# Patient Record
Sex: FEMALE | Race: WHITE | NOT HISPANIC OR LATINO | URBAN - METROPOLITAN AREA
[De-identification: names, ages, dates, MRNs, and addresses within clinical notes are randomized per-mention and may not be internally consistent; named-entity substitution may affect disease eponyms.]

---

## 2017-12-05 ENCOUNTER — APPOINTMENT (OUTPATIENT)
Dept: URBAN - METROPOLITAN AREA CLINIC 200 | Age: 47
Setting detail: DERMATOLOGY
End: 2017-12-11

## 2017-12-05 ENCOUNTER — APPOINTMENT (OUTPATIENT)
Dept: URBAN - METROPOLITAN AREA CLINIC 200 | Age: 47
Setting detail: DERMATOLOGY
End: 2017-12-12

## 2017-12-05 DIAGNOSIS — Z41.9 ENCOUNTER FOR PROCEDURE FOR PURPOSES OTHER THAN REMEDYING HEALTH STATE, UNSPECIFIED: ICD-10-CM

## 2017-12-05 DIAGNOSIS — L91.8 OTHER HYPERTROPHIC DISORDERS OF THE SKIN: ICD-10-CM

## 2017-12-05 DIAGNOSIS — L57.8 OTHER SKIN CHANGES DUE TO CHRONIC EXPOSURE TO NONIONIZING RADIATION: ICD-10-CM

## 2017-12-05 DIAGNOSIS — L81.4 OTHER MELANIN HYPERPIGMENTATION: ICD-10-CM

## 2017-12-05 DIAGNOSIS — L82.0 INFLAMED SEBORRHEIC KERATOSIS: ICD-10-CM

## 2017-12-05 DIAGNOSIS — D22 MELANOCYTIC NEVI: ICD-10-CM

## 2017-12-05 PROBLEM — D22.5 MELANOCYTIC NEVI OF TRUNK: Status: ACTIVE | Noted: 2017-12-05

## 2017-12-05 PROCEDURE — OTHER BENIGN DESTRUCTION COSMETIC: OTHER

## 2017-12-05 PROCEDURE — OTHER OBSERVATION AND MEASURE: OTHER

## 2017-12-05 PROCEDURE — OTHER COUNSELING: OTHER

## 2017-12-05 PROCEDURE — OTHER OBSERVATION: OTHER

## 2017-12-05 PROCEDURE — 99203 OFFICE O/P NEW LOW 30 MIN: CPT

## 2017-12-05 ASSESSMENT — LOCATION SIMPLE DESCRIPTION DERM
LOCATION SIMPLE: RIGHT INFERIOR MUCOSAL LIP
LOCATION SIMPLE: RIGHT UPPER BACK
LOCATION SIMPLE: RIGHT INFERIOR MUCOSAL LIP
LOCATION SIMPLE: POSTERIOR NECK
LOCATION SIMPLE: CHEST

## 2017-12-05 ASSESSMENT — LOCATION ZONE DERM
LOCATION ZONE: LIP
LOCATION ZONE: NECK
LOCATION ZONE: TRUNK
LOCATION ZONE: LIP
LOCATION ZONE: TRUNK

## 2017-12-05 ASSESSMENT — LOCATION DETAILED DESCRIPTION DERM
LOCATION DETAILED: RIGHT MEDIAL UPPER BACK
LOCATION DETAILED: UPPER STERNUM
LOCATION DETAILED: RIGHT INFERIOR MUCOSAL LIP
LOCATION DETAILED: RIGHT POSTERIOR NECK
LOCATION DETAILED: RIGHT INFERIOR MUCOSAL LIP

## 2017-12-05 NOTE — PROCEDURE: BENIGN DESTRUCTION COSMETIC
Post-Care Instructions: I reviewed with the patient in detail post-care instructions. Patient is to wear sunprotection, and avoid picking at any of the treated lesions. Pt may apply Vaseline to crusted or scabbing areas.
Anesthesia Volume In Cc: 0
Detail Level: Simple
Consent: The patient's consent was obtained including but not limited to risks of crusting, scabbing, blistering, scarring, darker or lighter pigmentary change, recurrence, incomplete removal and infection.

## 2019-08-19 ENCOUNTER — HOSPITAL ENCOUNTER (EMERGENCY)
Facility: HOSPITAL | Age: 49
Discharge: HOME | End: 2019-08-20
Attending: EMERGENCY MEDICINE
Payer: COMMERCIAL

## 2019-08-19 ENCOUNTER — APPOINTMENT (EMERGENCY)
Dept: RADIOLOGY | Facility: HOSPITAL | Age: 49
End: 2019-08-19
Attending: EMERGENCY MEDICINE
Payer: COMMERCIAL

## 2019-08-19 ENCOUNTER — HOSPITAL ENCOUNTER (OUTPATIENT)
Facility: CLINIC | Age: 49
Discharge: HOME | End: 2019-08-19
Attending: FAMILY MEDICINE
Payer: COMMERCIAL

## 2019-08-19 VITALS
HEART RATE: 72 BPM | RESPIRATION RATE: 19 BRPM | DIASTOLIC BLOOD PRESSURE: 92 MMHG | TEMPERATURE: 99.6 F | SYSTOLIC BLOOD PRESSURE: 160 MMHG

## 2019-08-19 DIAGNOSIS — R10.32 LEFT LOWER QUADRANT PAIN: Primary | ICD-10-CM

## 2019-08-19 DIAGNOSIS — N83.202 CYST OF LEFT OVARY: Primary | ICD-10-CM

## 2019-08-19 LAB
ALBUMIN SERPL-MCNC: 4 G/DL (ref 3.4–5)
ALP SERPL-CCNC: 63 IU/L (ref 35–126)
ALT SERPL-CCNC: 37 IU/L (ref 11–54)
ANION GAP SERPL CALC-SCNC: 9 MEQ/L (ref 3–15)
AST SERPL-CCNC: 34 IU/L (ref 15–41)
B-HCG UR QL: NEGATIVE
BASOPHILS # BLD: 0.06 K/UL (ref 0.01–0.1)
BASOPHILS NFR BLD: 0.6 %
BILIRUB SERPL-MCNC: 1.4 MG/DL (ref 0.3–1.2)
BILIRUBIN, POC: NEGATIVE
BLOOD URINE, POC: NEGATIVE
BUN SERPL-MCNC: 6 MG/DL (ref 8–20)
CALCIUM SERPL-MCNC: 8.8 MG/DL (ref 8.9–10.3)
CHLORIDE SERPL-SCNC: 103 MEQ/L (ref 98–109)
CLARITY, POC: CLEAR
CO2 SERPL-SCNC: 25 MEQ/L (ref 22–32)
COLOR, POC: YELLOW
CREAT SERPL-MCNC: 0.6 MG/DL
DIFFERENTIAL METHOD BLD: NORMAL
EOSINOPHIL # BLD: 0.16 K/UL (ref 0.04–0.36)
EOSINOPHIL NFR BLD: 1.7 %
ERYTHROCYTE [DISTWIDTH] IN BLOOD BY AUTOMATED COUNT: 12.3 % (ref 11.7–14.4)
GFR SERPL CREATININE-BSD FRML MDRD: >60 ML/MIN/1.73M*2
GLUCOSE SERPL-MCNC: 101 MG/DL (ref 70–99)
GLUCOSE URINE, POC: NEGATIVE
HCT VFR BLDCO AUTO: 38.1 %
HGB BLD-MCNC: 13.7 G/DL
IMM GRANULOCYTES # BLD AUTO: 0.05 K/UL (ref 0–0.08)
IMM GRANULOCYTES NFR BLD AUTO: 0.5 %
KETONES, POC: NEGATIVE
LEUKOCYTE EST, POC: NEGATIVE
LYMPHOCYTES # BLD: 2.29 K/UL (ref 1.2–3.5)
LYMPHOCYTES NFR BLD: 24.8 %
MAGNESIUM SERPL-MCNC: 1.9 MG/DL (ref 1.8–2.5)
MCH RBC QN AUTO: 32.5 PG (ref 28–33.2)
MCHC RBC AUTO-ENTMCNC: 36 G/DL (ref 32.2–35.5)
MCV RBC AUTO: 90.3 FL (ref 83–98)
MONOCYTES # BLD: 0.8 K/UL (ref 0.28–0.8)
MONOCYTES NFR BLD: 8.6 %
NEUTROPHILS # BLD: 5.89 K/UL (ref 1.7–7)
NEUTS SEG NFR BLD: 63.8 %
NITRITE, POC: NEGATIVE
NRBC BLD-RTO: 0 %
PDW BLD AUTO: 8.6 FL (ref 9.4–12.3)
PH, POC: 5
PLATELET # BLD AUTO: 247 K/UL
POCT TEST: NORMAL
POTASSIUM SERPL-SCNC: 3.4 MEQ/L (ref 3.6–5.1)
PROT SERPL-MCNC: 7.3 G/DL (ref 6–8.2)
PROTEIN, POC: NEGATIVE
RBC # BLD AUTO: 4.22 M/UL (ref 3.93–5.22)
SODIUM SERPL-SCNC: 137 MEQ/L (ref 136–144)
SPECIFIC GRAVITY, POC: 1.01
WBC # BLD AUTO: 9.25 K/UL

## 2019-08-19 PROCEDURE — 99284 EMERGENCY DEPT VISIT MOD MDM: CPT | Mod: 25

## 2019-08-19 PROCEDURE — 85025 COMPLETE CBC W/AUTO DIFF WBC: CPT | Performed by: EMERGENCY MEDICINE

## 2019-08-19 PROCEDURE — 74177 CT ABD & PELVIS W/CONTRAST: CPT

## 2019-08-19 PROCEDURE — 36415 COLL VENOUS BLD VENIPUNCTURE: CPT | Performed by: EMERGENCY MEDICINE

## 2019-08-19 PROCEDURE — 3E0337Z INTRODUCTION OF ELECTROLYTIC AND WATER BALANCE SUBSTANCE INTO PERIPHERAL VEIN, PERCUTANEOUS APPROACH: ICD-10-PCS | Performed by: EMERGENCY MEDICINE

## 2019-08-19 PROCEDURE — 63600105 HC IODINE BASED CONTRAST: Mod: JW | Performed by: EMERGENCY MEDICINE

## 2019-08-19 PROCEDURE — 96360 HYDRATION IV INFUSION INIT: CPT | Mod: 59

## 2019-08-19 PROCEDURE — 99202 OFFICE O/P NEW SF 15 MIN: CPT | Performed by: FAMILY MEDICINE

## 2019-08-19 PROCEDURE — 83735 ASSAY OF MAGNESIUM: CPT | Performed by: EMERGENCY MEDICINE

## 2019-08-19 PROCEDURE — S9088 SERVICES PROVIDED IN URGENT: HCPCS | Performed by: FAMILY MEDICINE

## 2019-08-19 PROCEDURE — 96361 HYDRATE IV INFUSION ADD-ON: CPT | Mod: 59

## 2019-08-19 PROCEDURE — 81002 URINALYSIS NONAUTO W/O SCOPE: CPT | Performed by: FAMILY MEDICINE

## 2019-08-19 PROCEDURE — 84450 TRANSFERASE (AST) (SGOT): CPT | Performed by: EMERGENCY MEDICINE

## 2019-08-19 PROCEDURE — 25800000 HC PHARMACY IV SOLUTIONS: Performed by: EMERGENCY MEDICINE

## 2019-08-19 RX ADMIN — SODIUM CHLORIDE 1000 ML: 9 INJECTION, SOLUTION INTRAVENOUS at 21:29

## 2019-08-19 RX ADMIN — IOHEXOL 110 ML: 350 INJECTION, SOLUTION INTRAVENOUS at 23:31

## 2019-08-19 ASSESSMENT — ENCOUNTER SYMPTOMS
HEADACHES: 0
JOINT SWELLING: 0
DIARRHEA: 0
FEVER: 1
DYSURIA: 0
DIFFICULTY URINATING: 0
PALPITATIONS: 0
ARTHRALGIAS: 0
SORE THROAT: 0
FEVER: 1
NAUSEA: 0
ABDOMINAL PAIN: 1
CHILLS: 0
VOMITING: 0
ABDOMINAL PAIN: 1
COUGH: 0
BLOOD IN STOOL: 0
NAUSEA: 0
DIARRHEA: 0
SHORTNESS OF BREATH: 0

## 2019-08-19 NOTE — ED PROVIDER NOTES
History  No chief complaint on file.    Five day history of LLQ abdominal pain.  She has had a low grade temp for several days.  Now the pain is across he entire abdomen.  She has pressure around her bladder but no dysuria  She has bilateral lower back pain.  She has no history of diverticulitis but it does run in her family  BM's normal with this episode.             No past medical history on file.    No past surgical history on file.    No family history on file.    Social History   Substance Use Topics   • Smoking status: Not on file   • Smokeless tobacco: Not on file   • Alcohol use Not on file       Review of Systems   Constitutional: Positive for fever.   Gastrointestinal: Positive for abdominal pain. Negative for blood in stool, diarrhea and nausea.       Physical Exam  ED Triage Vitals [08/19/19 1934]   Temp Heart Rate Resp BP SpO2   37.6 °C (99.6 °F) 72 19 (!) 160/92 --      Temp src Heart Rate Source Patient Position BP Location FiO2 (%) (Set)   -- -- -- Left upper arm --       Physical Exam   Constitutional: She appears well-nourished.   Abdominal: Soft. She exhibits no distension and no mass. There is tenderness (Suprapubic> LLQ> RLQ). There is guarding (very slight suprapubically). There is no rebound.   Psychiatric: She has a normal mood and affect. Her behavior is normal.   Vitals reviewed.        Procedures  Procedures    UC Course  Clinical Impressions as of Aug 19 2002   Left lower quadrant pain       MDM  Number of Diagnoses or Management Options  Left lower quadrant pain:   Diagnosis management comments: Abdominal pain and fever  Go to ER for labs and CT scan                 Asif Jackson MD  08/19/19 2002       Asif Jackson MD  08/19/19 2029

## 2019-08-20 ENCOUNTER — TRANSCRIBE ORDERS (OUTPATIENT)
Dept: SCHEDULING | Age: 49
End: 2019-08-20

## 2019-08-20 VITALS
DIASTOLIC BLOOD PRESSURE: 83 MMHG | HEIGHT: 64 IN | BODY MASS INDEX: 29.09 KG/M2 | WEIGHT: 170.4 LBS | TEMPERATURE: 97.9 F | RESPIRATION RATE: 16 BRPM | HEART RATE: 82 BPM | OXYGEN SATURATION: 99 % | SYSTOLIC BLOOD PRESSURE: 133 MMHG

## 2019-08-20 DIAGNOSIS — N83.202 CYST OF LEFT OVARY: Primary | ICD-10-CM

## 2019-08-20 NOTE — ED PROVIDER NOTES
"HPI     Chief Complaint   Patient presents with   • Abdominal Pain     49 y.o. female w/ hx of HTN presents to ED c/o abdominal pain and low grade fever. Pt was sent directly from urgent care. Pt reports that she initially had LLQ abdominal pain that began x 4 days. Currently in ED, pt admits to pain across the entire abdomen. Pt reports pain feels like a \"tightness\" and \"pressure\". Pt admits to taking advil to mask the pain for the last 3 days but without relief. Pt has a family hx of diverticulitis. Pt has past surgical history of 3 C-sections. Pt denies CP, SOB, chills, NV, HA, dizziness/lightheadedness, rash or any other complaints at this time.          History provided by:  Patient   used: No    Abdominal Pain   Pain location:  Suprapubic  Pain quality: pressure    Pain severity:  Moderate  Onset quality:  Gradual  Duration:  4 days  Timing:  Constant  Progression:  Unable to specify  Chronicity:  New  Relieved by: Advil.  Ineffective treatments:  None tried  Associated symptoms: fever (low grade fever)    Associated symptoms: no chest pain, no chills, no cough, no diarrhea, no dysuria, no nausea, no shortness of breath, no sore throat and no vomiting         Patient History     Past Medical History:   Diagnosis Date   • Hypertension        Past Surgical History:   Procedure Laterality Date   •  SECTION     • TUBAL LIGATION         History reviewed. No pertinent family history.    Social History   Substance Use Topics   • Smoking status: Never Smoker   • Smokeless tobacco: Never Used   • Alcohol use Yes       Systems Reviewed from Nursing Triage:          Review of Systems     Review of Systems   Constitutional: Positive for fever (low grade fever). Negative for chills.   HENT: Negative for ear pain and sore throat.    Respiratory: Negative for cough and shortness of breath.    Cardiovascular: Negative for chest pain, palpitations and leg swelling.   Gastrointestinal: Positive for " "abdominal pain. Negative for diarrhea, nausea and vomiting.   Endocrine: Negative for polyuria.   Genitourinary: Negative for difficulty urinating and dysuria.   Musculoskeletal: Negative for arthralgias and joint swelling.   Skin: Negative for rash.   Neurological: Negative for headaches.        Physical Exam     ED Triage Vitals   Temp Heart Rate Resp BP SpO2   08/19/19 2029 08/19/19 2029 08/19/19 2029 08/19/19 2029 08/19/19 2029   36.8 °C (98.3 °F) 91 18 (!) 154/98 100 %      Temp Source Heart Rate Source Patient Position BP Location FiO2 (%) (Set)   08/19/19 2029 08/19/19 2131 08/19/19 2131 08/19/19 2131 --   Oral Monitor Sitting Right upper arm                      Patient Vitals for the past 24 hrs:   BP Temp Temp src Pulse Resp SpO2 Height Weight   08/19/19 2131 (!) 147/87 - - 88 16 99 % - -   08/19/19 2029 (!) 154/98 36.8 °C (98.3 °F) Oral 91 18 100 % 1.626 m (5' 4\") 77.3 kg (170 lb 6.4 oz)           Physical Exam   Constitutional: She appears well-developed.   HENT:   Head: Normocephalic and atraumatic.   Eyes: Conjunctivae are normal.   Neck: Normal range of motion.   Cardiovascular: Normal rate, regular rhythm and intact distal pulses.    Pulmonary/Chest: Effort normal and breath sounds normal.   Abdominal: Soft. There is tenderness (mild) in the suprapubic area. There is no rebound and no guarding.   Musculoskeletal: Normal range of motion. She exhibits no tenderness or deformity.   Neurological: She is alert. No cranial nerve deficit.   No motor deficit   Skin: Skin is warm and dry.   Psychiatric: She has a normal mood and affect. Her behavior is normal.   Nursing note and vitals reviewed.           Procedures    ED Course & MDM     Labs Reviewed   COMPREHENSIVE METABOLIC PANEL - Abnormal        Result Value    Sodium 137      Potassium 3.4 (*)     Chloride 103      CO2 25      BUN 6 (*)     Creatinine 0.6      Glucose 101 (*)     Calcium 8.8 (*)     AST (SGOT) 34      ALT (SGPT) 37      Alkaline " Phosphatase 63      Total Protein 7.3      Albumin 4.0      Bilirubin, Total 1.4 (*)     eGFR >60.0      Anion Gap 9     CBC - Abnormal     WBC 9.25      RBC 4.22      Hemoglobin 13.7      Hematocrit 38.1      MCV 90.3      MCH 32.5      MCHC 36.0 (*)     RDW 12.3      Platelets 247      MPV 8.6 (*)    MAGNESIUM - Normal    Magnesium 1.9     CBC AND DIFFERENTIAL    Narrative:     The following orders were created for panel order CBC and differential.  Procedure                               Abnormality         Status                     ---------                               -----------         ------                     CBC[[id number]]                           Abnormal            Final result               Diff Count[[id number]]                                        Final result                 Please view results for these tests on the individual orders.   DIFF COUNT    Differential Type Auto      nRBC 0.0      Immature Granulocytes 0.5      Neutrophils 63.8      Lymphocytes 24.8      Monocytes 8.6      Eosinophils 1.7      Basophils 0.6      Immature Granulocytes, Absolute 0.05      Neutrophils, Absolute 5.89      Lymphocytes, Absolute 2.29      Monocytes, Absolute 0.80      Eosinophils, Absolute 0.16      Basophils, Absolute 0.06     BHCG, URINE, QUAL   POCT BHCG, URINE, QUAL (BEAKER)    POCT BhCG, Urine Qual Negative      POC Test POC         CT ABDOMEN PELVIS WITH IV CONTRAST   ED Interpretation   [facility]   Teleradiology Cases          This communication is confidential. The information contained herein is protected from unauthorized use by privilege or law. Copying, distribution, disclosure, or other use of this information is prohibited. You are required to destroy this communication if you have received it in error.                Patient Name: GLADYS CASTELLANOS  Exam(s): a/p standard CT    MR#: [id number]          History: LLQ PAIN WITH FEVER      Preliminary Results:       No bowel  distention, free fluid, free air or hydronephrosis. Fatty liver. Normal appendix.       3.6 cm left ovarian cyst.      Interpreted by: Johan Perkins MD, Aug 20, 2019 01:00 AM             GLADYS CASTELLANOS [id number], Aug 19, 2019            By signing my name below, I, Juan Ley, attest that this documentation has been prepared under the direction and in the presence of AMPARO Penny MD.     8/20/2019 1:09 AM      IYASMANY, personally performed the services described in this documentation, as documented by the scribe in my presence, and it is both accurate and complete.  8/20/2019 1:09 AM              MDM  Number of Diagnoses or Management Options  Cyst of left ovary:   Diagnosis management comments: She is a 49-year-old female presents complaining of left lower quadrant abdominal pain for several days.  Abdomen was soft mildly tender left lower quadrant.  No significant rebound or guarding.  CT scan of abdomen pelvis was negative for any acute intra-abdominal pathology.  Only significant finding was a 3.5 cm left ovarian cyst.  Patient informed of diagnosis and plan.  Will discharge home recommend following up with ultrasound of left ovarian cyst and 2 to 3 weeks.  Patient has GYN at [facility] will follow up with them.       Amount and/or Complexity of Data Reviewed  Clinical lab tests: reviewed  Independent visualization of images, tracings, or specimens: yes             Clinical Impressions as of Aug 20 0109   Cyst of left ovary        Juan Ley  08/19/19 2057       YASMANY Penny MD  08/20/19 0109

## 2019-08-20 NOTE — DISCHARGE INSTRUCTIONS
Your urine dip was completely normal.  You do not have a UTI.  Your symptoms are consistent with diverticulitis but with the five day history of fevers and extending abdominal pain you need to go over to the ER at [facility] for labs and a CT scan of your abdomen to make a definitive diagnosis as there are several things that can cause your symptoms

## 2019-08-21 ENCOUNTER — HOSPITAL ENCOUNTER (OUTPATIENT)
Dept: RADIOLOGY | Age: 49
Discharge: HOME | End: 2019-08-21
Attending: STUDENT IN AN ORGANIZED HEALTH CARE EDUCATION/TRAINING PROGRAM
Payer: COMMERCIAL

## 2019-08-21 DIAGNOSIS — N83.202 CYST OF LEFT OVARY: ICD-10-CM

## 2019-08-21 PROCEDURE — 76856 US EXAM PELVIC COMPLETE: CPT

## 2019-09-06 ENCOUNTER — OFFICE VISIT (OUTPATIENT)
Dept: PRIMARY CARE | Facility: CLINIC | Age: 49
End: 2019-09-06
Payer: COMMERCIAL

## 2019-09-06 VITALS
HEIGHT: 64 IN | HEART RATE: 85 BPM | OXYGEN SATURATION: 97 % | SYSTOLIC BLOOD PRESSURE: 150 MMHG | TEMPERATURE: 98.2 F | DIASTOLIC BLOOD PRESSURE: 100 MMHG | BODY MASS INDEX: 29.19 KG/M2 | RESPIRATION RATE: 12 BRPM | WEIGHT: 171 LBS

## 2019-09-06 DIAGNOSIS — I10 HYPERTENSION, UNSPECIFIED TYPE: ICD-10-CM

## 2019-09-06 DIAGNOSIS — Z00.00 ENCOUNTER FOR GENERAL ADULT MEDICAL EXAMINATION WITHOUT ABNORMAL FINDINGS: Primary | ICD-10-CM

## 2019-09-06 DIAGNOSIS — R06.83 SNORING: ICD-10-CM

## 2019-09-06 DIAGNOSIS — Z23 IMMUNIZATION DUE: ICD-10-CM

## 2019-09-06 PROBLEM — K76.0 FATTY LIVER: Status: ACTIVE | Noted: 2019-09-06

## 2019-09-06 PROCEDURE — 90471 IMMUNIZATION ADMIN: CPT | Performed by: FAMILY MEDICINE

## 2019-09-06 PROCEDURE — 90715 TDAP VACCINE 7 YRS/> IM: CPT | Performed by: FAMILY MEDICINE

## 2019-09-06 PROCEDURE — 99386 PREV VISIT NEW AGE 40-64: CPT | Mod: 25 | Performed by: FAMILY MEDICINE

## 2019-09-06 ASSESSMENT — ENCOUNTER SYMPTOMS
COUGH: 0
DIZZINESS: 0
NAUSEA: 0
BLOOD IN STOOL: 0
CHILLS: 0
CONSTIPATION: 0
ABDOMINAL PAIN: 0
WEAKNESS: 0
WHEEZING: 0
HEADACHES: 0
DIARRHEA: 0
POLYDIPSIA: 0
FEVER: 0
HEMATURIA: 0
NUMBNESS: 0
POLYPHAGIA: 0
VOMITING: 0
DYSURIA: 0
SHORTNESS OF BREATH: 0

## 2019-09-06 NOTE — PROGRESS NOTES
"Subjective      Patient ID: Elda Curiel is a 49 y.o. female.  1970      npp  No c/o  bp fluctuates,better when wt down,use to take med        The following have been reviewed and updated as appropriate in this visit:  Problems       Review of Systems   Constitutional: Negative for chills and fever.   HENT: Negative for dental problem and hearing loss.    Eyes: Negative for visual disturbance.   Respiratory: Negative for cough, shortness of breath and wheezing.    Cardiovascular: Negative for chest pain.   Gastrointestinal: Negative for abdominal pain, blood in stool, constipation, diarrhea, nausea and vomiting.   Endocrine: Negative for cold intolerance, heat intolerance, polydipsia, polyphagia and polyuria.   Genitourinary: Negative for dysuria, hematuria, vaginal bleeding and vaginal discharge.   Musculoskeletal: Negative for gait problem.   Skin: Negative for rash.   Neurological: Negative for dizziness, weakness, numbness and headaches.       Objective     Vitals:    09/06/19 1110 09/06/19 1156   BP: 140/90 (!) 150/100   BP Location: Right upper arm    Patient Position: Sitting    Pulse: 85    Resp: 12    Temp: 36.8 °C (98.2 °F)    TempSrc: Oral    SpO2: 97%    Weight: 77.6 kg (171 lb)    Height: 1.626 m (5' 4\")      Body mass index is 29.35 kg/m².    Physical Exam   Constitutional: She is oriented to person, place, and time. She appears well-developed and well-nourished. No distress.   HENT:   Head: Normocephalic and atraumatic.   Right Ear: Tympanic membrane, external ear and ear canal normal.   Left Ear: Tympanic membrane, external ear and ear canal normal.   Nose: Nose normal.   Mouth/Throat: Oropharynx is clear and moist.   Eyes: Pupils are equal, round, and reactive to light. Conjunctivae and EOM are normal. No scleral icterus.   Neck: Normal range of motion. Neck supple. No JVD present. No tracheal deviation present. No thyromegaly present.   Cardiovascular: Normal rate, regular rhythm " and normal heart sounds.  Exam reveals no gallop and no friction rub.    No murmur heard.  Pulmonary/Chest: Effort normal and breath sounds normal.   Abdominal: Soft. Bowel sounds are normal. She exhibits no distension and no mass. There is no hepatosplenomegaly. There is no tenderness. There is no rebound and no guarding. No hernia.   Musculoskeletal: Normal range of motion. She exhibits no edema or deformity.   Lymphadenopathy:     She has no cervical adenopathy.   Neurological: She is alert and oriented to person, place, and time. She has normal strength.   gnf   Skin: Skin is warm and dry. No rash noted.   Psychiatric: She has a normal mood and affect. Her behavior is normal.   Vitals reviewed.      Assessment/Plan   Diagnoses and all orders for this visit:    Encounter for general adult medical examination without abnormal findings (Primary)  Comments:  Tdap  sees Gyn    Hypertension, unspecified type  Assessment & Plan:  Low sodium diet  Regular exercise  Weight loss  Sleep study  F/u 2 months    Orders:  -     Comprehensive metabolic panel; Future  -     TSH; Future  -     Lipid panel; Future  -     Urinalysis with Reflex Culture; Future    Snoring  Comments:  sleep study    Immunization due  -     Tdap vaccine greater than or equal to 6yo IM

## 2019-10-21 ENCOUNTER — TRANSCRIBE ORDERS (OUTPATIENT)
Dept: SCHEDULING | Age: 49
End: 2019-10-21

## 2019-10-21 DIAGNOSIS — R06.83 SNORING: ICD-10-CM

## 2019-10-21 DIAGNOSIS — G47.33 OBSTRUCTIVE SLEEP APNEA (ADULT) (PEDIATRIC): Primary | ICD-10-CM

## 2019-10-21 DIAGNOSIS — I10 ESSENTIAL (PRIMARY) HYPERTENSION: ICD-10-CM

## 2019-11-06 ENCOUNTER — HOSPITAL ENCOUNTER (OUTPATIENT)
Dept: SLEEP MEDICINE | Facility: HOSPITAL | Age: 49
Discharge: HOME | End: 2019-11-06
Attending: FAMILY MEDICINE
Payer: COMMERCIAL

## 2019-11-06 DIAGNOSIS — R06.83 SNORING: ICD-10-CM

## 2019-11-06 DIAGNOSIS — G47.33 OBSTRUCTIVE SLEEP APNEA (ADULT) (PEDIATRIC): ICD-10-CM

## 2019-11-06 DIAGNOSIS — I10 ESSENTIAL (PRIMARY) HYPERTENSION: ICD-10-CM

## 2019-11-06 PROCEDURE — G0399 HOME SLEEP TEST/TYPE 3 PORTA: HCPCS

## 2019-11-18 ENCOUNTER — TELEPHONE (OUTPATIENT)
Dept: PRIMARY CARE | Facility: CLINIC | Age: 49
End: 2019-11-18

## 2019-11-18 PROBLEM — G47.33 OSA (OBSTRUCTIVE SLEEP APNEA): Status: ACTIVE | Noted: 2019-11-18

## 2019-11-18 NOTE — TELEPHONE ENCOUNTER
Spoke with pt. She rehana call the number to follow up with sleep study.    Maggy, if you could let me know when the papers are scanned so I can call pt. Thank you

## 2019-12-12 ENCOUNTER — TELEPHONE (OUTPATIENT)
Dept: PRIMARY CARE | Facility: CLINIC | Age: 49
End: 2019-12-12

## 2019-12-12 NOTE — TELEPHONE ENCOUNTER
Amparo from [facility] needs to get an office note faxed over to [phone]. Office note has to be regarding a CPAP. Phone # [phone].

## 2020-01-01 LAB
ALBUMIN SERPL-MCNC: 4.5 G/DL (ref 3.5–5.5)
ALBUMIN/GLOB SERPL: 1.9 {RATIO} (ref 1.2–2.2)
ALP SERPL-CCNC: 75 IU/L (ref 39–117)
ALT SERPL-CCNC: 75 IU/L (ref 0–32)
APPEARANCE UR: CLEAR
AST SERPL-CCNC: 53 IU/L (ref 0–40)
BACTERIA #/AREA URNS HPF: NORMAL /[HPF]
BILIRUB SERPL-MCNC: 1 MG/DL (ref 0–1.2)
BILIRUB UR QL STRIP: NEGATIVE
BUN SERPL-MCNC: 10 MG/DL (ref 6–24)
BUN/CREAT SERPL: 15 (ref 9–23)
CALCIUM SERPL-MCNC: 8.9 MG/DL (ref 8.7–10.2)
CHLORIDE SERPL-SCNC: 102 MMOL/L (ref 96–106)
CHOLEST SERPL-MCNC: 205 MG/DL (ref 100–199)
CO2 SERPL-SCNC: 22 MMOL/L (ref 20–29)
COLOR UR: YELLOW
CREAT SERPL-MCNC: 0.68 MG/DL (ref 0.57–1)
EPI CELLS #/AREA URNS HPF: NORMAL /HPF (ref 0–10)
GLOBULIN SER CALC-MCNC: 2.4 G/DL (ref 1.5–4.5)
GLUCOSE SERPL-MCNC: 111 MG/DL (ref 65–99)
GLUCOSE UR QL: NEGATIVE
HDLC SERPL-MCNC: 59 MG/DL
HGB UR QL STRIP: NEGATIVE
KETONES UR QL STRIP: NEGATIVE
LAB CORP EGFR IF AFRICN AM: 120 ML/MIN/1.73
LAB CORP EGFR IF NONAFRICN AM: 105 ML/MIN/1.73
LAB CORP URINALYSIS REFLEX: NORMAL
LDLC SERPL CALC-MCNC: 114 MG/DL (ref 0–99)
LEUKOCYTE ESTERASE UR QL STRIP: NEGATIVE
MICRO URNS: NORMAL
MICRO URNS: NORMAL
MUCOUS THREADS URNS QL MICRO: PRESENT
NITRITE UR QL STRIP: NEGATIVE
PH UR STRIP: 5.5 [PH] (ref 5–7.5)
POTASSIUM SERPL-SCNC: 4.3 MMOL/L (ref 3.5–5.2)
PROT SERPL-MCNC: 6.9 G/DL (ref 6–8.5)
PROT UR QL STRIP: NEGATIVE
RBC #/AREA URNS HPF: NORMAL /HPF (ref 0–2)
SODIUM SERPL-SCNC: 140 MMOL/L (ref 134–144)
SP GR UR: 1.02 (ref 1–1.03)
TRIGL SERPL-MCNC: 162 MG/DL (ref 0–149)
TSH SERPL DL<=0.005 MIU/L-ACNC: 1.47 UIU/ML (ref 0.45–4.5)
UROBILINOGEN UR STRIP-MCNC: 0.2 MG/DL (ref 0.2–1)
VLDLC SERPL CALC-MCNC: 32 MG/DL (ref 5–40)
WBC #/AREA URNS HPF: NORMAL /HPF (ref 0–5)

## 2020-01-02 DIAGNOSIS — R73.01 ELEVATED FASTING GLUCOSE: ICD-10-CM

## 2020-01-02 DIAGNOSIS — R79.89 ELEVATED LFTS: Primary | ICD-10-CM

## 2020-01-04 LAB
BASOPHILS # BLD AUTO: 0 X10E3/UL (ref 0–0.2)
BASOPHILS NFR BLD AUTO: 1 %
EOSINOPHIL # BLD AUTO: 0.1 X10E3/UL (ref 0–0.4)
EOSINOPHIL NFR BLD AUTO: 3 %
ERYTHROCYTE [DISTWIDTH] IN BLOOD BY AUTOMATED COUNT: 13.2 % (ref 12.3–15.4)
FERRITIN SERPL-MCNC: 111 NG/ML (ref 15–150)
HAV IGM SERPL QL IA: NEGATIVE
HBA1C MFR BLD: 4.8 % (ref 4.8–5.6)
HBV CORE IGM SERPL QL IA: NEGATIVE
HBV SURFACE AG SERPL QL IA: NEGATIVE
HCT VFR BLD AUTO: 43.7 % (ref 34–46.6)
HCV AB S/CO SERPL IA: 0.2 S/CO RATIO (ref 0–0.9)
HGB BLD-MCNC: 14.7 G/DL (ref 11.1–15.9)
IMM GRANULOCYTES # BLD AUTO: 0 X10E3/UL (ref 0–0.1)
IMM GRANULOCYTES NFR BLD AUTO: 0 %
IRON SATN MFR SERPL: 32 % (ref 15–55)
IRON SERPL-MCNC: 122 UG/DL (ref 27–159)
LYMPHOCYTES # BLD AUTO: 1.5 X10E3/UL (ref 0.7–3.1)
LYMPHOCYTES NFR BLD AUTO: 30 %
MCH RBC QN AUTO: 31.6 PG (ref 26.6–33)
MCHC RBC AUTO-ENTMCNC: 33.6 G/DL (ref 31.5–35.7)
MCV RBC AUTO: 94 FL (ref 79–97)
MONOCYTES # BLD AUTO: 0.3 X10E3/UL (ref 0.1–0.9)
MONOCYTES NFR BLD AUTO: 7 %
NEUTROPHILS # BLD AUTO: 3 X10E3/UL (ref 1.4–7)
NEUTROPHILS NFR BLD AUTO: 59 %
PLATELET # BLD AUTO: 219 X10E3/UL (ref 150–450)
RBC # BLD AUTO: 4.65 X10E6/UL (ref 3.77–5.28)
TIBC SERPL-MCNC: 382 UG/DL (ref 250–450)
UIBC SERPL-MCNC: 260 UG/DL (ref 131–425)
WBC # BLD AUTO: 5.1 X10E3/UL (ref 3.4–10.8)

## 2020-02-21 ENCOUNTER — HOSPITAL ENCOUNTER (OUTPATIENT)
Dept: RADIOLOGY | Age: 50
Discharge: HOME | End: 2020-02-21
Attending: FAMILY MEDICINE
Payer: COMMERCIAL

## 2020-02-21 ENCOUNTER — OFFICE VISIT (OUTPATIENT)
Dept: PRIMARY CARE | Facility: CLINIC | Age: 50
End: 2020-02-21
Payer: COMMERCIAL

## 2020-02-21 VITALS
WEIGHT: 164 LBS | RESPIRATION RATE: 18 BRPM | TEMPERATURE: 100 F | BODY MASS INDEX: 28 KG/M2 | HEIGHT: 64 IN | SYSTOLIC BLOOD PRESSURE: 134 MMHG | OXYGEN SATURATION: 98 % | DIASTOLIC BLOOD PRESSURE: 80 MMHG | HEART RATE: 94 BPM

## 2020-02-21 DIAGNOSIS — J11.1 FLU: Primary | ICD-10-CM

## 2020-02-21 DIAGNOSIS — R07.1 CHEST PAIN ON BREATHING: ICD-10-CM

## 2020-02-21 DIAGNOSIS — R93.89 CHEST X-RAY ABNORMALITY: Primary | ICD-10-CM

## 2020-02-21 PROCEDURE — 99214 OFFICE O/P EST MOD 30 MIN: CPT | Performed by: FAMILY MEDICINE

## 2020-02-21 PROCEDURE — 71046 X-RAY EXAM CHEST 2 VIEWS: CPT

## 2020-02-21 RX ORDER — OSELTAMIVIR PHOSPHATE 75 MG/1
75 CAPSULE ORAL 2 TIMES DAILY
Qty: 10 CAPSULE | Refills: 0 | Status: SHIPPED | OUTPATIENT
Start: 2020-02-21 | End: 2020-02-24

## 2020-02-21 ASSESSMENT — ENCOUNTER SYMPTOMS
STRIDOR: 0
CHILLS: 1
DIARRHEA: 0
FEVER: 1
FATIGUE: 1
SHORTNESS OF BREATH: 0
EYE DISCHARGE: 0
COUGH: 1
FLU SYMPTOMS: 1
SORE THROAT: 0
ABDOMINAL PAIN: 0
WHEEZING: 0
VOMITING: 0
RHINORRHEA: 0

## 2020-02-21 NOTE — PROGRESS NOTES
"Subjective      Patient ID: Elda Curiel is a 49 y.o. female.  1970      Flu Symptoms   This is a new problem. Episode onset: 3 dys. The problem occurs constantly. The problem has been unchanged. Associated symptoms include chest pain, chills, coughing, fatigue and a fever. Pertinent negatives include no abdominal pain, congestion, rash, sore throat or vomiting. Nothing aggravates the symptoms. She has tried acetaminophen and rest for the symptoms. The treatment provided moderate relief.       The following have been reviewed and updated as appropriate in this visit:  Problems       Review of Systems   Constitutional: Positive for chills, fatigue and fever.   HENT: Negative for congestion, ear discharge, ear pain, postnasal drip, rhinorrhea and sore throat.    Eyes: Negative for discharge.   Respiratory: Positive for cough. Negative for shortness of breath, wheezing and stridor.    Cardiovascular: Positive for chest pain. Negative for leg swelling.        L pleuritic   Gastrointestinal: Negative for abdominal pain, diarrhea and vomiting.   Skin: Negative for rash.       Objective     Vitals:    02/21/20 1134   BP: 134/80   BP Location: Left upper arm   Patient Position: Sitting   Pulse: 94   Resp: 18   Temp: 37.8 °C (100 °F)   TempSrc: Oral   SpO2: 98%   Weight: 74.4 kg (164 lb)   Height: 1.626 m (5' 4\")     Body mass index is 28.15 kg/m².    Physical Exam   Constitutional: She appears well-developed and well-nourished. No distress.   HENT:   Right Ear: Tympanic membrane, external ear and ear canal normal.   Left Ear: Tympanic membrane, external ear and ear canal normal.   Nose: Nose normal.   Mouth/Throat: Oropharynx is clear and moist.   Eyes: Pupils are equal, round, and reactive to light. Conjunctivae are normal. No scleral icterus.   Neck: No tracheal deviation present. No thyromegaly present.   Cardiovascular: Normal rate, regular rhythm and normal heart sounds.   Pulmonary/Chest: Effort normal " and breath sounds normal. No stridor. No respiratory distress. She exhibits no tenderness.   Musculoskeletal: She exhibits no edema.   Lymphadenopathy:     She has no cervical adenopathy.   Neurological: She is alert.   Skin: Skin is warm and dry. No rash noted. No erythema.   Psychiatric: She has a normal mood and affect.   Vitals reviewed.      Assessment/Plan   Diagnoses and all orders for this visit:    Flu (Primary)  Comments:  sx tx  tamiflu    Chest pain on breathing  Comments:  cxr r/o pna  Orders:  -     X-RAY CHEST 2 VIEWS; Future    Other orders  -     oseltamivir (TAMIFLU) 75 mg capsule; Take 1 capsule (75 mg total) by mouth 2 (two) times a day for 5 days.

## 2020-02-24 ENCOUNTER — OFFICE VISIT (OUTPATIENT)
Dept: PRIMARY CARE | Facility: CLINIC | Age: 50
End: 2020-02-24
Payer: COMMERCIAL

## 2020-02-24 ENCOUNTER — TELEPHONE (OUTPATIENT)
Dept: PRIMARY CARE | Facility: CLINIC | Age: 50
End: 2020-02-24

## 2020-02-24 VITALS
HEART RATE: 114 BPM | SYSTOLIC BLOOD PRESSURE: 130 MMHG | HEIGHT: 64 IN | DIASTOLIC BLOOD PRESSURE: 70 MMHG | OXYGEN SATURATION: 96 % | WEIGHT: 162 LBS | BODY MASS INDEX: 27.66 KG/M2 | RESPIRATION RATE: 14 BRPM | TEMPERATURE: 99.7 F

## 2020-02-24 DIAGNOSIS — J22 LOWER RESPIRATORY INFECTION: Primary | ICD-10-CM

## 2020-02-24 DIAGNOSIS — R91.8 ABNORMALITY OF LUNG ON CXR: ICD-10-CM

## 2020-02-24 PROCEDURE — 99213 OFFICE O/P EST LOW 20 MIN: CPT | Performed by: FAMILY MEDICINE

## 2020-02-24 RX ORDER — PROMETHAZINE HYDROCHLORIDE AND DEXTROMETHORPHAN HYDROBROMIDE 6.25; 15 MG/5ML; MG/5ML
5 SYRUP ORAL EVERY 4 HOURS PRN
Qty: 120 ML | Refills: 0 | Status: CANCELLED | OUTPATIENT
Start: 2020-02-24 | End: 2020-03-05

## 2020-02-24 RX ORDER — PROMETHAZINE HYDROCHLORIDE AND CODEINE PHOSPHATE 6.25; 1 MG/5ML; MG/5ML
5 SOLUTION ORAL EVERY 4 HOURS PRN
Qty: 120 ML | Refills: 0 | Status: SHIPPED | OUTPATIENT
Start: 2020-02-24 | End: 2020-05-27

## 2020-02-24 RX ORDER — AZITHROMYCIN 250 MG/1
TABLET, FILM COATED ORAL
Qty: 6 TABLET | Refills: 0 | Status: SHIPPED | OUTPATIENT
Start: 2020-02-24 | End: 2020-05-27

## 2020-02-24 ASSESSMENT — ENCOUNTER SYMPTOMS
STRIDOR: 0
COUGH: 1
ABDOMINAL PAIN: 0
VOMITING: 0
CHILLS: 1
SORE THROAT: 0
DIARRHEA: 0
EYE DISCHARGE: 0
WHEEZING: 0
SHORTNESS OF BREATH: 0
FEVER: 1
RHINORRHEA: 0

## 2020-02-24 NOTE — PROGRESS NOTES
"Subjective      Patient ID: Elda Curiel is a 49 y.o. female.  1970      URI    This is a new problem. The current episode started in the past 7 days. The problem has been unchanged. The maximum temperature recorded prior to her arrival was 100.4 - 100.9 F. Associated symptoms include coughing. Pertinent negatives include no abdominal pain, chest pain, congestion, diarrhea, ear pain, rhinorrhea, sore throat, vomiting or wheezing. She has tried acetaminophen and NSAIDs for the symptoms. The treatment provided moderate relief.       The following have been reviewed and updated as appropriate in this visit:  Allergies  Meds  Problems       Review of Systems   Constitutional: Positive for chills and fever.   HENT: Negative for congestion, ear discharge, ear pain, postnasal drip, rhinorrhea and sore throat.    Eyes: Negative for discharge.   Respiratory: Positive for cough. Negative for shortness of breath, wheezing and stridor.    Cardiovascular: Negative for chest pain and leg swelling.   Gastrointestinal: Negative for abdominal pain, diarrhea and vomiting.       Objective     Vitals:    02/24/20 1446   BP: 130/70   BP Location: Left upper arm   Patient Position: Sitting   Pulse: (!) 114   Resp: 14   Temp: 37.6 °C (99.7 °F)   TempSrc: Oral   SpO2: 96%   Weight: 73.5 kg (162 lb)   Height: 1.626 m (5' 4\")     Body mass index is 27.81 kg/m².    Physical Exam   Constitutional: She appears well-developed and well-nourished. No distress.   HENT:   Right Ear: External ear normal.   Left Ear: External ear normal.   Eyes: Pupils are equal, round, and reactive to light. Conjunctivae are normal. No scleral icterus.   Neck: No tracheal deviation present. No thyromegaly present.   Cardiovascular: Normal rate, regular rhythm and normal heart sounds.   Pulmonary/Chest: Effort normal and breath sounds normal. No stridor. No respiratory distress.   Musculoskeletal: She exhibits no edema.   Lymphadenopathy:     She has " no cervical adenopathy.   Neurological: She is alert.   Skin: She is not diaphoretic.   Psychiatric: She has a normal mood and affect.   Vitals reviewed.      Assessment/Plan   Diagnoses and all orders for this visit:    Lower respiratory infection (Primary)  Comments:  sx tx  zpak    Abnormality of lung on CXR  Comments:  CT ordered    Other orders  -     azithromycin (ZITHROMAX) 250 mg tablet; Take 2 tablets the first day, then 1 tablet daily for 4 days.  -     promethazine-codeine (PHENERGAN with CODEINE) 6.25-10 mg/5 mL syrup; Take 5 mL by mouth every 4 (four) hours as needed for cough for up to 10 days.

## 2020-02-24 NOTE — TELEPHONE ENCOUNTER
Pt wants to get her chest xray results before she comes in for her appt today. Please call pt [phone].

## 2020-02-24 NOTE — TELEPHONE ENCOUNTER
Precert for CT Chest without IV contrast  [facility] #[id number]  Valid 02/24/2020 to 03/24/2020

## 2020-02-24 NOTE — TELEPHONE ENCOUNTER
Please advise. Pt states that she doesn't think it's the flu. She says she just has a bad cough and thinks it may b bronchitus and she has a fever that won't go away.

## 2020-02-24 NOTE — TELEPHONE ENCOUNTER
CXR is negative for pneumonia; does show a possible pulmonary nodule  CT chest ordered to further evaluate

## 2020-02-26 ENCOUNTER — TELEPHONE (OUTPATIENT)
Dept: PRIMARY CARE | Facility: CLINIC | Age: 50
End: 2020-02-26

## 2020-02-26 RX ORDER — METHYLPREDNISOLONE 4 MG/1
TABLET ORAL
Qty: 21 TABLET | Refills: 0 | Status: SHIPPED | OUTPATIENT
Start: 2020-02-26 | End: 2020-05-27

## 2020-02-26 NOTE — TELEPHONE ENCOUNTER
Pt is still having cough, and fever still going on. Fever never reaches over 101. It is between  and not taking cough meds during the day just at night as prescribed. Pt is worried about the fever and is not feeling like the motrin and the ibuprofen are helping. Pt took an otc uti test and it is not that. She has no aches or pain at all but just not feeling 100%. Please advise

## 2020-03-05 ENCOUNTER — TRANSCRIBE ORDERS (OUTPATIENT)
Dept: SCHEDULING | Age: 50
End: 2020-03-05

## 2020-03-05 ENCOUNTER — HOSPITAL ENCOUNTER (OUTPATIENT)
Dept: RADIOLOGY | Age: 50
Discharge: HOME | End: 2020-03-05
Attending: STUDENT IN AN ORGANIZED HEALTH CARE EDUCATION/TRAINING PROGRAM
Payer: COMMERCIAL

## 2020-03-05 DIAGNOSIS — N83.202 UNSPECIFIED OVARIAN CYST, LEFT SIDE: ICD-10-CM

## 2020-03-05 DIAGNOSIS — N83.202 UNSPECIFIED OVARIAN CYST, LEFT SIDE: Primary | ICD-10-CM

## 2020-03-05 PROCEDURE — 76830 TRANSVAGINAL US NON-OB: CPT

## 2020-03-23 ENCOUNTER — HOSPITAL ENCOUNTER (OUTPATIENT)
Dept: RADIOLOGY | Facility: HOSPITAL | Age: 50
Discharge: HOME | End: 2020-03-23
Attending: STUDENT IN AN ORGANIZED HEALTH CARE EDUCATION/TRAINING PROGRAM
Payer: COMMERCIAL

## 2020-03-23 VITALS — BODY MASS INDEX: 28.32 KG/M2 | WEIGHT: 165 LBS

## 2020-03-23 DIAGNOSIS — N83.202 UNSPECIFIED OVARIAN CYST, LEFT SIDE: ICD-10-CM

## 2020-03-23 PROCEDURE — A9585 GADOBUTROL INJECTION: HCPCS | Performed by: STUDENT IN AN ORGANIZED HEALTH CARE EDUCATION/TRAINING PROGRAM

## 2020-03-23 PROCEDURE — 72197 MRI PELVIS W/O & W/DYE: CPT

## 2020-03-23 RX ORDER — GADOBUTROL 604.72 MG/ML
0.1 INJECTION INTRAVENOUS ONCE
Status: COMPLETED | OUTPATIENT
Start: 2020-03-23 | End: 2020-03-23

## 2020-03-23 RX ADMIN — GADOBUTROL 7.5 MMOL: 604.72 INJECTION INTRAVENOUS at 11:30

## 2020-05-27 ENCOUNTER — TELEMEDICINE (OUTPATIENT)
Dept: PRIMARY CARE | Facility: CLINIC | Age: 50
End: 2020-05-27
Payer: COMMERCIAL

## 2020-05-27 DIAGNOSIS — J22 LOWER RESPIRATORY INFECTION: ICD-10-CM

## 2020-05-27 DIAGNOSIS — M54.50 ACUTE LEFT-SIDED LOW BACK PAIN WITHOUT SCIATICA: Primary | ICD-10-CM

## 2020-05-27 PROCEDURE — 99213 OFFICE O/P EST LOW 20 MIN: CPT | Mod: 95 | Performed by: FAMILY MEDICINE

## 2020-05-27 RX ORDER — CYCLOBENZAPRINE HCL 10 MG
10 TABLET ORAL 3 TIMES DAILY PRN
Qty: 20 TABLET | Refills: 0 | Status: SHIPPED | OUTPATIENT
Start: 2020-05-27 | End: 2021-09-17

## 2020-05-27 ASSESSMENT — ENCOUNTER SYMPTOMS
BACK PAIN: 1
FEVER: 0
DYSURIA: 0
NUMBNESS: 0
ROS GI COMMENTS: NO BOWEL INCONT
SHORTNESS OF BREATH: 0
CHILLS: 0
ABDOMINAL PAIN: 0
HEMATURIA: 0
WEAKNESS: 0
COUGH: 0

## 2020-05-27 NOTE — PROGRESS NOTES
Verification of Patient Location:  The patient affirms they are currently located in the following state: [place]    Request for Consent:   Audio Only Encounter   You and I are about to have a telemedicine check-in or visit. This is allowed because you have requested it. This telemedicine visit will be billed to your health insurance or you, if you are self-insured. You understand you will be responsible for any copayments or coinsurances that apply to your telemedicine visit. Before starting our telemedicine visit, I am required to get your consent for this virtual check-in or visit by telemedicine. Do you consent?    Patient Response to Request for Consent:  Yes      Visit Documentation:  Subjective     Patient ID: Elda Curiel is a 49 y.o. female.  1970      Back Pain   This is a new problem. The current episode started yesterday (happened while shaving legs). The problem is unchanged. The pain is present in the lumbar spine. The pain does not radiate. The pain is severe. The symptoms are aggravated by bending and position. Pertinent negatives include no abdominal pain, dysuria, fever, numbness or weakness. She has tried NSAIDs and ice for the symptoms. The treatment provided moderate relief.       The following have been reviewed and updated as appropriate in this visit:  Allergies  Meds  Problems       Review of Systems   Constitutional: Negative for chills and fever.   Respiratory: Negative for cough and shortness of breath.    Gastrointestinal: Negative for abdominal pain.        No bowel incont   Genitourinary: Negative for dysuria and hematuria.        No bladder incont   Musculoskeletal: Positive for back pain. Negative for gait problem.   Skin: Negative for rash.   Neurological: Negative for weakness and numbness.         Assessment/Plan   Diagnoses and all orders for this visit:    Acute left-sided low back pain without sciatica (Primary)  Comments:  ice  motrin 800 mg TID w/  food  flexeril  follow    Lower respiratory infection  Comments:  previous  requests covid 19 ab test  Orders:  -     Covid-19 SARS CoV-2 Antibody (IgG); Future    Other orders  -     cyclobenzaprine (FLEXERIL) 10 mg tablet; Take 1 tablet (10 mg total) by mouth 3 (three) times a day as needed for muscle spasms for up to 14 days.        Time Spent in Medical Discussion During This Encounter:      Total encounter time, with >50 percent spent counseling/coordinating: 15 minutes

## 2020-07-10 LAB — SARS-COV-2 IGG SERPL QL IA: NEGATIVE

## 2020-08-03 ENCOUNTER — TELEPHONE (OUTPATIENT)
Dept: PRIMARY CARE | Facility: CLINIC | Age: 50
End: 2020-08-03

## 2020-08-03 ENCOUNTER — TELEMEDICINE (OUTPATIENT)
Dept: PRIMARY CARE | Facility: CLINIC | Age: 50
End: 2020-08-03
Payer: COMMERCIAL

## 2020-08-03 DIAGNOSIS — B00.1 RECURRENT COLD SORES: Primary | ICD-10-CM

## 2020-08-03 PROCEDURE — 99212 OFFICE O/P EST SF 10 MIN: CPT | Mod: 95 | Performed by: FAMILY MEDICINE

## 2020-08-03 RX ORDER — VALACYCLOVIR HYDROCHLORIDE 1 G/1
TABLET, FILM COATED ORAL
Qty: 30 TABLET | Refills: 0 | Status: SHIPPED | OUTPATIENT
Start: 2020-08-03 | End: 2021-11-16 | Stop reason: SDUPTHER

## 2020-08-03 ASSESSMENT — ENCOUNTER SYMPTOMS
WEAKNESS: 0
CONSTIPATION: 0
HEADACHES: 0
NAUSEA: 0
FEVER: 0
HEMATURIA: 0
DIARRHEA: 0
BLOOD IN STOOL: 0
VOMITING: 0
COUGH: 0
SORE THROAT: 0
ABDOMINAL PAIN: 0
SHORTNESS OF BREATH: 0
WHEEZING: 0
DIZZINESS: 0
CHILLS: 0
DYSURIA: 0
NUMBNESS: 0

## 2020-08-03 NOTE — PROGRESS NOTES
Verification of Patient Location:  The patient affirms they are currently located in the following state: [place]    Request for Consent:    Audio Only Encounter   You and I are about to have a telemedicine check-in or visit. This is allowed because you have requested it. This telemedicine visit will be billed to your health insurance or you, if you are self-insured. You understand you will be responsible for any copayments or coinsurances that apply to your telemedicine visit. Before starting our telemedicine visit, I am required to get your consent for this virtual check-in or visit by telemedicine. Do you consent?    Patient Response to Request for Consent:  Yes      Visit Documentation:  Subjective     Patient ID: Elda Curiel is a 50 y.o. female.  1970      Needs refill on valtrex for cold sores      The following have been reviewed and updated as appropriate in this visit:  Tobacco  Allergies  Meds  Problems  Med Hx  Surg Hx  Fam Hx  Soc Hx        Review of Systems   Constitutional: Negative for chills and fever.   HENT: Negative for dental problem, hearing loss and sore throat.    Eyes: Negative for visual disturbance.   Respiratory: Negative for cough, shortness of breath and wheezing.    Cardiovascular: Negative for chest pain.   Gastrointestinal: Negative for abdominal pain, blood in stool, constipation, diarrhea, nausea and vomiting.   Genitourinary: Negative for dysuria, hematuria, vaginal bleeding and vaginal discharge.   Musculoskeletal: Negative for gait problem.   Skin: Negative for rash.   Neurological: Negative for dizziness, syncope, weakness, numbness and headaches.         Assessment/Plan   Diagnoses and all orders for this visit:    Recurrent cold sores (Primary)  Comments:  valtrex prn    Other orders  -     valACYclovir (VALTREX) 1 gram tablet; 2 tabs po BID x 2 doses prn cold sores        Time Spent in Medical Discussion During This Encounter:    Total encounter  time, with >50 percent spent counseling/coordinatin minutes

## 2021-02-23 ENCOUNTER — TELEMEDICINE (OUTPATIENT)
Dept: PRIMARY CARE | Facility: CLINIC | Age: 51
End: 2021-02-23
Payer: COMMERCIAL

## 2021-02-23 DIAGNOSIS — U07.1 LAB TEST POSITIVE FOR DETECTION OF COVID-19 VIRUS: Primary | ICD-10-CM

## 2021-02-23 PROCEDURE — 99213 OFFICE O/P EST LOW 20 MIN: CPT | Mod: 95 | Performed by: FAMILY MEDICINE

## 2021-02-23 NOTE — PROGRESS NOTES
Verification of Patient Location:  The patient affirms they are currently located in the following state:[place]     Request for Consent:  You and I are about to have a telemedicine check-in or visit. This is allowed because you are already my patient, and you have requested it.  This telemedicine visit will be billed to your health insurance or you, if you are self-insured.  You understand you will be responsible for any copayments or coinsurances that apply to your telemedicine visit.  Before starting our telemedicine visit, I am required to get your consent for this virtual check-in or visit by telemedicine. Do you consent?      Patient Response to Request for Consent: Yes    The following have been reviewed and updated as appropriate in this visit:       Active Ambulatory Problems     Diagnosis Date Noted   • HTN (hypertension) 09/06/2019   • Fatty liver 09/06/2019   • ESTER (obstructive sleep apnea) 11/18/2019   • Elevated fasting glucose 01/02/2020   • Elevated LFTs 01/02/2020     Resolved Ambulatory Problems     Diagnosis Date Noted   • No Resolved Ambulatory Problems     Past Medical History:   Diagnosis Date   • Hypertension        Current Outpatient Medications:   •  cyclobenzaprine (FLEXERIL) 10 mg tablet, Take 1 tablet (10 mg total) by mouth 3 (three) times a day as needed for muscle spasms for up to 14 days., Disp: 20 tablet, Rfl: 0  •  valACYclovir (VALTREX) 1 gram tablet, 2 tabs po BID x 2 doses prn cold sores, Disp: 30 tablet, Rfl: 0  No Known Allergies  Social History     Socioeconomic History   • Marital status:      Spouse name: Not on file   • Number of children: Not on file   • Years of education: Not on file   • Highest education level: Not on file   Occupational History   • Not on file   Social Needs   • Financial resource strain: Not on file   • Food insecurity     Worry: Not on file     Inability: Not on file   • Transportation needs     Medical: Not on file     Non-medical:  Not on file   Tobacco Use   • Smoking status: Never Smoker   • Smokeless tobacco: Never Used   Substance and Sexual Activity   • Alcohol use: Yes   • Drug use: No   • Sexual activity: Not on file   Lifestyle   • Physical activity     Days per week: Not on file     Minutes per session: Not on file   • Stress: Not on file   Relationships   • Social connections     Talks on phone: Not on file     Gets together: Not on file     Attends Rastafari service: Not on file     Active member of club or organization: Not on file     Attends meetings of clubs or organizations: Not on file     Relationship status: Not on file   • Intimate partner violence     Fear of current or ex partner: Not on file     Emotionally abused: Not on file     Physically abused: Not on file     Forced sexual activity: Not on file   Other Topics Concern   • Not on file   Social History Narrative   • Not on file     Family History   Problem Relation Age of Onset   • Breast cancer Biological Mother    • Hodgkin's lymphoma Biological Father      Past Surgical History:   Procedure Laterality Date   •  SECTION     • TUBAL LIGATION         Visit Documentation:    Tested positive for Covid. Has cold like symptoms. Started with symptoms 4 days ago.  Informed today that was covid positive. Has cold like symptoms.   Feels ok. Advised to rest, quarantine until 3/5/21. Stay hydrated and eat well.  Discussed at length with patient. All questions asked and answered. Patient stated understanding.            Time Spent:  I spent 22 minutes on this date of service performing the following activities: obtaining history, entering orders, documenting and obtaining / reviewing records.

## 2021-03-08 RX ORDER — PROMETHAZINE HYDROCHLORIDE AND CODEINE PHOSPHATE 6.25; 1 MG/5ML; MG/5ML
5 SOLUTION ORAL EVERY 4 HOURS PRN
Qty: 120 ML | Refills: 0 | Status: SHIPPED | OUTPATIENT
Start: 2021-03-08 | End: 2021-03-18

## 2021-03-08 NOTE — TELEPHONE ENCOUNTER
Patient is getting over Covid, she is requesting refill of the Promethazine to be called into the [facility] for her cough.

## 2021-03-19 ENCOUNTER — TELEPHONE (OUTPATIENT)
Dept: PRIMARY CARE | Facility: CLINIC | Age: 51
End: 2021-03-19

## 2021-03-19 NOTE — TELEPHONE ENCOUNTER
Esthela you sent a message back to the pt  She did have covid, however, they think her daughter now has it. Is the pt good for 90 days or should she stay away from her daughter. You can call or e-mail her. Phone # [phone]

## 2021-05-28 ENCOUNTER — APPOINTMENT (OUTPATIENT)
Dept: URBAN - METROPOLITAN AREA CLINIC 200 | Age: 51
Setting detail: DERMATOLOGY
End: 2021-05-30

## 2021-05-28 DIAGNOSIS — L91.8 OTHER HYPERTROPHIC DISORDERS OF THE SKIN: ICD-10-CM

## 2021-05-28 DIAGNOSIS — L81.4 OTHER MELANIN HYPERPIGMENTATION: ICD-10-CM

## 2021-05-28 DIAGNOSIS — L57.8 OTHER SKIN CHANGES DUE TO CHRONIC EXPOSURE TO NONIONIZING RADIATION: ICD-10-CM

## 2021-05-28 PROBLEM — D23.5 OTHER BENIGN NEOPLASM OF SKIN OF TRUNK: Status: ACTIVE | Noted: 2021-05-28

## 2021-05-28 PROCEDURE — OTHER COUNSELING: OTHER

## 2021-05-28 PROCEDURE — OTHER OBSERVATION: OTHER

## 2021-05-28 PROCEDURE — OTHER OBSERVATION AND MEASURE: OTHER

## 2021-05-28 PROCEDURE — OTHER LIQUID NITROGEN (COSMETIC): OTHER

## 2021-05-28 PROCEDURE — 99203 OFFICE O/P NEW LOW 30 MIN: CPT

## 2021-05-28 ASSESSMENT — LOCATION DETAILED DESCRIPTION DERM
LOCATION DETAILED: LEFT MEDIAL SUPERIOR CHEST
LOCATION DETAILED: RIGHT LATERAL TRAPEZIAL NECK
LOCATION DETAILED: RIGHT MEDIAL TRAPEZIAL NECK
LOCATION DETAILED: SUBXIPHOID
LOCATION DETAILED: LEFT INFERIOR LATERAL NECK
LOCATION DETAILED: LEFT INFRAMAMMARY CREASE (INNER QUADRANT)
LOCATION DETAILED: RIGHT ANTERIOR SHOULDER
LOCATION DETAILED: RIGHT INFERIOR MUCOSAL LIP
LOCATION DETAILED: RIGHT INFERIOR MUCOSAL LIP
LOCATION DETAILED: RIGHT ANTERIOR PROXIMAL UPPER ARM
LOCATION DETAILED: LEFT AXILLARY VAULT
LOCATION DETAILED: RIGHT INFRAMAMMARY CREASE (INNER QUADRANT)

## 2021-05-28 ASSESSMENT — LOCATION ZONE DERM
LOCATION ZONE: AXILLAE
LOCATION ZONE: LIP
LOCATION ZONE: TRUNK
LOCATION ZONE: LIP
LOCATION ZONE: NECK
LOCATION ZONE: TRUNK
LOCATION ZONE: ARM

## 2021-05-28 ASSESSMENT — LOCATION SIMPLE DESCRIPTION DERM
LOCATION SIMPLE: RIGHT INFERIOR MUCOSAL LIP
LOCATION SIMPLE: RIGHT UPPER ARM
LOCATION SIMPLE: ABDOMEN
LOCATION SIMPLE: LEFT AXILLARY VAULT
LOCATION SIMPLE: RIGHT SHOULDER
LOCATION SIMPLE: LEFT ANTERIOR NECK
LOCATION SIMPLE: POSTERIOR NECK
LOCATION SIMPLE: CHEST
LOCATION SIMPLE: RIGHT INFERIOR MUCOSAL LIP
LOCATION SIMPLE: LEFT BREAST
LOCATION SIMPLE: RIGHT BREAST

## 2021-05-28 NOTE — PROCEDURE: LIQUID NITROGEN (COSMETIC)
Render Post-Care Instructions In Note?: no
Detail Level: Generalized
Billing Information: Bill by Static Price
Consent: The patient's consent was obtained including but not limited to risks of crusting, scabbing, blistering, scarring, darker or lighter pigmentary change, recurrence, incomplete removal and infection. The patient understands that the procedure is cosmetic in nature and is not covered by insurance.
Post-Care Instructions: I reviewed with the patient in detail post-care instructions. Patient is to wear sunprotection, and avoid picking at any of the treated lesions. Pt may apply Vaseline to crusted or scabbing areas.
Price (Use Numbers Only, No Special Characters Or $): 100.00

## 2021-09-17 ENCOUNTER — OFFICE VISIT (OUTPATIENT)
Dept: PRIMARY CARE | Facility: CLINIC | Age: 51
End: 2021-09-17
Payer: COMMERCIAL

## 2021-09-17 VITALS
BODY MASS INDEX: 31.07 KG/M2 | RESPIRATION RATE: 16 BRPM | OXYGEN SATURATION: 99 % | DIASTOLIC BLOOD PRESSURE: 80 MMHG | HEIGHT: 64 IN | WEIGHT: 182 LBS | HEART RATE: 92 BPM | TEMPERATURE: 97.6 F | SYSTOLIC BLOOD PRESSURE: 126 MMHG

## 2021-09-17 DIAGNOSIS — N39.0 URINARY TRACT INFECTION WITHOUT HEMATURIA, SITE UNSPECIFIED: Primary | ICD-10-CM

## 2021-09-17 LAB
BILIRUBIN, POC: NEGATIVE
BLOOD URINE, POC: POSITIVE
CLARITY, POC: CLEAR
COLOR, POC: YELLOW
GLUCOSE URINE, POC: NEGATIVE
KETONES, POC: NEGATIVE
LEUKOCYTE EST, POC: NORMAL
NITRITE, POC: NEGATIVE
PH, POC: 5
PROTEIN, POC: NORMAL
SPECIFIC GRAVITY, POC: 1
UROBILINOGEN, POC: 0.2

## 2021-09-17 PROCEDURE — 81002 URINALYSIS NONAUTO W/O SCOPE: CPT | Performed by: FAMILY MEDICINE

## 2021-09-17 PROCEDURE — 90686 IIV4 VACC NO PRSV 0.5 ML IM: CPT | Performed by: FAMILY MEDICINE

## 2021-09-17 PROCEDURE — 99213 OFFICE O/P EST LOW 20 MIN: CPT | Mod: 25 | Performed by: FAMILY MEDICINE

## 2021-09-17 PROCEDURE — 90471 IMMUNIZATION ADMIN: CPT | Performed by: FAMILY MEDICINE

## 2021-09-17 PROCEDURE — 3008F BODY MASS INDEX DOCD: CPT | Performed by: FAMILY MEDICINE

## 2021-09-17 RX ORDER — CIPROFLOXACIN 250 MG/1
250 TABLET, FILM COATED ORAL 2 TIMES DAILY
Qty: 10 TABLET | Refills: 0 | Status: SHIPPED | OUTPATIENT
Start: 2021-09-17 | End: 2021-09-22

## 2021-09-17 ASSESSMENT — ENCOUNTER SYMPTOMS
BACK PAIN: 0
DIAPHORESIS: 0
DIFFICULTY URINATING: 0
CHILLS: 0
ABDOMINAL PAIN: 1
NAUSEA: 0
DIARRHEA: 0
HEMATURIA: 1
FEVER: 0
VOMITING: 0
FLANK PAIN: 0
FREQUENCY: 1
DYSURIA: 1

## 2021-09-17 ASSESSMENT — PATIENT HEALTH QUESTIONNAIRE - PHQ9: SUM OF ALL RESPONSES TO PHQ9 QUESTIONS 1 & 2: 0

## 2021-09-17 NOTE — PROGRESS NOTES
"      Subjective      Patient ID: Elda Curiel is a 51 y.o. female.  1970      uti sxs x 2 days      The following have been reviewed and updated as appropriate in this visit:  Allergies  Meds  Problems       Review of Systems   Constitutional: Negative for chills, diaphoresis and fever.   Gastrointestinal: Positive for abdominal pain. Negative for diarrhea, nausea and vomiting.   Genitourinary: Positive for dysuria, frequency, hematuria and urgency. Negative for difficulty urinating, flank pain, genital sores, vaginal bleeding and vaginal discharge.   Musculoskeletal: Negative for back pain.       Objective     Vitals:    09/17/21 1226   BP: 126/80   BP Location: Left upper arm   Patient Position: Sitting   Pulse: 92   Resp: 16   Temp: 36.4 °C (97.6 °F)   TempSrc: Temporal   SpO2: 99%   Weight: 82.6 kg (182 lb)   Height: 1.626 m (5' 4\")     Body mass index is 31.24 kg/m².    Physical Exam  Vitals and nursing note reviewed.   Constitutional:       Appearance: Normal appearance.   Eyes:      General: No scleral icterus.  Abdominal:      General: Bowel sounds are normal. There is no distension.      Palpations: Abdomen is soft. There is no mass.      Tenderness: There is no abdominal tenderness. There is no right CVA tenderness, left CVA tenderness, guarding or rebound.      Hernia: No hernia is present.   Musculoskeletal:      Right lower leg: No edema.      Left lower leg: No edema.   Skin:     General: Skin is warm and dry.      Findings: No rash.   Neurological:      Mental Status: She is alert.         Assessment/Plan   Diagnoses and all orders for this visit:    Urinary tract infection without hematuria, site unspecified (Primary)  Comments:  cx  fluids  cipro  Orders:  -     POCT urinalysis dipstick    Other orders  -     ciprofloxacin (CIPRO) 250 mg tablet; Take 1 tablet (250 mg total) by mouth 2 (two) times a day for 5 days.  -     Influenza vaccine quadrivalent preservative free 6 mon and " older IM (FluLaval)

## 2021-11-16 RX ORDER — VALACYCLOVIR HYDROCHLORIDE 1 G/1
TABLET, FILM COATED ORAL
Qty: 30 TABLET | Refills: 0 | Status: SHIPPED | OUTPATIENT
Start: 2021-11-16 | End: 2022-09-14 | Stop reason: SDUPTHER

## 2021-12-16 ENCOUNTER — TRANSCRIBE ORDERS (OUTPATIENT)
Dept: SCHEDULING | Age: 51
End: 2021-12-16
Payer: COMMERCIAL

## 2021-12-16 DIAGNOSIS — N92.6 IRREGULAR MENSTRUATION, UNSPECIFIED: Primary | ICD-10-CM

## 2022-01-10 ENCOUNTER — HOSPITAL ENCOUNTER (OUTPATIENT)
Dept: RADIOLOGY | Age: 52
Discharge: HOME | End: 2022-01-10
Attending: NURSE PRACTITIONER
Payer: COMMERCIAL

## 2022-01-10 DIAGNOSIS — N92.6 IRREGULAR MENSTRUATION, UNSPECIFIED: ICD-10-CM

## 2022-01-10 PROCEDURE — 76830 TRANSVAGINAL US NON-OB: CPT

## 2022-02-14 ENCOUNTER — APPOINTMENT (OUTPATIENT)
Dept: URBAN - METROPOLITAN AREA CLINIC 200 | Age: 52
Setting detail: DERMATOLOGY
End: 2022-02-20

## 2022-02-14 DIAGNOSIS — L72.8 OTHER FOLLICULAR CYSTS OF THE SKIN AND SUBCUTANEOUS TISSUE: ICD-10-CM

## 2022-02-14 DIAGNOSIS — L08.9 LOCAL INFECTION OF THE SKIN AND SUBCUTANEOUS TISSUE, UNSPECIFIED: ICD-10-CM

## 2022-02-14 DIAGNOSIS — Z11.52 ENCOUNTER FOR SCREENING FOR COVID-19: ICD-10-CM

## 2022-02-14 PROCEDURE — OTHER SCREENING FOR COVID-19: OTHER

## 2022-02-14 PROCEDURE — OTHER PRESCRIPTION MEDICATION MANAGEMENT: OTHER

## 2022-02-14 PROCEDURE — OTHER INTRALESIONAL KENALOG: OTHER

## 2022-02-14 PROCEDURE — OTHER COUNSELING: OTHER

## 2022-02-14 PROCEDURE — 99212 OFFICE O/P EST SF 10 MIN: CPT | Mod: 25

## 2022-02-14 PROCEDURE — 11900 INJECT SKIN LESIONS </W 7: CPT

## 2022-02-14 PROCEDURE — OTHER MIPS QUALITY: OTHER

## 2022-02-14 PROCEDURE — OTHER ORDER TESTS: OTHER

## 2022-02-14 PROCEDURE — OTHER PRESCRIPTION: OTHER

## 2022-02-14 RX ORDER — DOXYCYCLINE HYCLATE 100 MG/1
TABLET, COATED ORAL
Qty: 28 | Refills: 0 | Status: ERX | COMMUNITY
Start: 2022-02-14

## 2022-02-14 ASSESSMENT — LOCATION DETAILED DESCRIPTION DERM
LOCATION DETAILED: NASAL ROOT
LOCATION DETAILED: PERIUMBILICAL SKIN

## 2022-02-14 ASSESSMENT — LOCATION SIMPLE DESCRIPTION DERM
LOCATION SIMPLE: NOSE
LOCATION SIMPLE: ABDOMEN

## 2022-02-14 ASSESSMENT — LOCATION ZONE DERM
LOCATION ZONE: NOSE
LOCATION ZONE: TRUNK

## 2022-02-14 NOTE — PROCEDURE: PRESCRIPTION MEDICATION MANAGEMENT
Samples Given: Epiduo forte
Render In Strict Bullet Format?: No
Detail Level: Zone
Initiate Treatment: Doxycycline and epiduo

## 2022-02-14 NOTE — PROCEDURE: INTRALESIONAL KENALOG
Include Z78.9 (Other Specified Conditions Influencing Health Status) As An Associated Diagnosis?: No
Medical Necessity Clause: This procedure was medically necessary because the lesions that were treated were:
Validate Note Data When Using Inventory: Yes
Total Volume Injected (Ccs- Only Use Numbers And Decimals): .2
Concentration Of Solution Injected (Mg/Ml): 2.5
Detail Level: Detailed
Consent: The risks of atrophy were reviewed with the patient.
Kenalog Preparation: Kenalog
X Size Of Lesion In Cm (Optional): 0

## 2022-09-14 ENCOUNTER — OFFICE VISIT (OUTPATIENT)
Dept: PRIMARY CARE | Facility: CLINIC | Age: 52
End: 2022-09-14
Payer: COMMERCIAL

## 2022-09-14 VITALS
DIASTOLIC BLOOD PRESSURE: 68 MMHG | OXYGEN SATURATION: 99 % | SYSTOLIC BLOOD PRESSURE: 120 MMHG | TEMPERATURE: 98.7 F | WEIGHT: 166.8 LBS | RESPIRATION RATE: 18 BRPM | BODY MASS INDEX: 28.48 KG/M2 | HEIGHT: 64 IN | HEART RATE: 84 BPM

## 2022-09-14 DIAGNOSIS — K29.00 OTHER ACUTE GASTRITIS WITHOUT HEMORRHAGE: ICD-10-CM

## 2022-09-14 DIAGNOSIS — Z12.11 SCREEN FOR COLON CANCER: ICD-10-CM

## 2022-09-14 DIAGNOSIS — R63.0 LOSS OF APPETITE: ICD-10-CM

## 2022-09-14 DIAGNOSIS — K21.00 GASTROESOPHAGEAL REFLUX DISEASE WITH ESOPHAGITIS, UNSPECIFIED WHETHER HEMORRHAGE: ICD-10-CM

## 2022-09-14 DIAGNOSIS — U09.9 LONG COVID: Primary | ICD-10-CM

## 2022-09-14 PROCEDURE — 3008F BODY MASS INDEX DOCD: CPT

## 2022-09-14 PROCEDURE — 99213 OFFICE O/P EST LOW 20 MIN: CPT

## 2022-09-14 RX ORDER — VALACYCLOVIR HYDROCHLORIDE 1 G/1
TABLET, FILM COATED ORAL
Qty: 30 TABLET | Refills: 0 | Status: SHIPPED | OUTPATIENT
Start: 2022-09-14 | End: 2023-05-11 | Stop reason: SDUPTHER

## 2022-09-14 RX ORDER — OMEPRAZOLE 20 MG/1
20 CAPSULE, DELAYED RELEASE ORAL
Qty: 90 CAPSULE | Refills: 1 | Status: SHIPPED | OUTPATIENT
Start: 2022-09-14 | End: 2022-10-21

## 2022-09-14 NOTE — PROGRESS NOTES
"Subjective      No chief complaint on file.     Patient ID: Elda Curiel is a 52 y.o. female presents today c/o:    HPI  Pt presents today s/p Covid infection in 2022 and had a poor appetite, which has not recovered.    Pt reports central abdomen pain that is intermittent and \"random\"  Does not last long, but is not resolved.    Pt reports that it occurs every week of the month    Pt took tums which seems to help.      Pt reports occasional dizziness and nausea        The following have been reviewed and updated as appropriate in this visit:   Tobacco  Allergies  Meds  Problems  Med Hx  Surg Hx  Fam Hx         Review of Systems   All other systems reviewed and are negative.    Current Outpatient Medications   Medication Sig Dispense Refill    omeprazole (PriLOSEC) 20 mg capsule Take 1 capsule (20 mg total) by mouth daily before breakfast. 90 capsule 1    valACYclovir (VALTREX) 1 gram tablet 2 tabs po BID x 2 doses prn cold sores 30 tablet 0     No current facility-administered medications for this visit.     Past Medical History:   Diagnosis Date    Hypertension      Family History   Problem Relation Age of Onset    Breast cancer Biological Mother     Hodgkin's lymphoma Biological Father      No Known Allergies  Past Surgical History:   Procedure Laterality Date     SECTION      TUBAL LIGATION       Social History     Socioeconomic History    Marital status:      Spouse name: None    Number of children: None    Years of education: None    Highest education level: None   Tobacco Use    Smoking status: Never Smoker    Smokeless tobacco: Never Used   Substance and Sexual Activity    Alcohol use: Yes    Drug use: No            Objective     Vitals:   Vitals:    22 1105   BP: 120/68   Pulse: 84   Resp: 18   Temp: 37.1 °C (98.7 °F)   SpO2: 99%   Weight: 75.7 kg (166 lb 12.8 oz)   Height: 1.626 m (5' 4\")       Physical Exam  Vitals reviewed.   Constitutional:       " Appearance: Normal appearance. She is normal weight.   HENT:      Head: Normocephalic and atraumatic.      Mouth/Throat:      Mouth: Mucous membranes are moist.   Cardiovascular:      Rate and Rhythm: Normal rate and regular rhythm.      Pulses: Normal pulses.      Heart sounds: Normal heart sounds. No murmur heard.  Pulmonary:      Effort: Pulmonary effort is normal.      Breath sounds: Normal breath sounds. No wheezing.   Abdominal:      General: Abdomen is flat. Bowel sounds are normal.      Palpations: Abdomen is soft.      Tenderness: There is no abdominal tenderness.   Musculoskeletal:         General: Normal range of motion.      Cervical back: Normal range of motion and neck supple.   Lymphadenopathy:      Cervical: No cervical adenopathy.   Skin:     General: Skin is warm and dry.   Neurological:      General: No focal deficit present.      Mental Status: She is alert and oriented to person, place, and time.   Psychiatric:         Mood and Affect: Mood normal.         Behavior: Behavior normal.         Thought Content: Thought content normal.         Judgment: Judgment normal.         Labs  Lab Results   Component Value Date    WBC 5.1 01/03/2020    HGB 14.7 01/03/2020    HCT 43.7 01/03/2020     01/03/2020    CHOL 205 (H) 12/31/2019    TRIG 162 (H) 12/31/2019    HDL 59 12/31/2019    LDLCALC 114 (H) 12/31/2019    ALT 75 (H) 12/31/2019    AST 53 (H) 12/31/2019     12/31/2019    K 4.3 12/31/2019    GLUCOSE 111 (H) 12/31/2019     12/31/2019    CREATININE 0.68 12/31/2019    BUN 10 12/31/2019    CO2 22 12/31/2019    TSH 1.470 12/31/2019    HGBA1C 4.8 01/03/2020    EGFR 105 12/31/2019    EGFR 120 12/31/2019              Assessment/Plan   Diagnoses and all orders for this visit:    Long COVID  -     CBC and differential; Future  -     Comprehensive metabolic panel; Future  -     Lipid panel; Future  -     CBC and differential  -     Comprehensive metabolic panel  -     Lipid panel    Other acute  gastritis without hemorrhage  -     CBC and differential; Future  -     Comprehensive metabolic panel; Future  -     Lipid panel; Future  -     Direct Access Colonoscopy [facility]  -     CBC and differential  -     Comprehensive metabolic panel  -     Lipid panel    Loss of appetite  -     CBC and differential; Future  -     Comprehensive metabolic panel; Future  -     Lipid panel; Future  -     Direct Access Colonoscopy [facility]  -     CBC and differential  -     Comprehensive metabolic panel  -     Lipid panel    Screen for colon cancer  -     Direct Access Colonoscopy [facility]    Gastroesophageal reflux disease with esophagitis, unspecified whether hemorrhage  -     omeprazole (PriLOSEC) 20 mg capsule; Take 1 capsule (20 mg total) by mouth daily before breakfast.    Other orders  -     valACYclovir (VALTREX) 1 gram tablet; 2 tabs po BID x 2 doses prn cold sores         Patient verbalizes understanding and agrees with plan of care today.        SHANKAR Grace  9/14/2022

## 2022-09-15 ENCOUNTER — TELEPHONE (OUTPATIENT)
Dept: PRIMARY CARE | Facility: CLINIC | Age: 52
End: 2022-09-15

## 2022-09-15 LAB
ALBUMIN SERPL-MCNC: 4.5 G/DL (ref 3.8–4.8)
ALBUMIN/GLOB SERPL: 1.7 {RATIO} (ref 1.2–2.2)
ALP SERPL-CCNC: 97 IU/L (ref 44–121)
ALT SERPL-CCNC: 43 IU/L (ref 0–32)
AST SERPL-CCNC: 38 IU/L (ref 0–40)
BASOPHILS # BLD AUTO: 0.1 X10E3/UL (ref 0–0.2)
BASOPHILS NFR BLD AUTO: 1 %
BILIRUB SERPL-MCNC: 0.8 MG/DL (ref 0–1.2)
BUN SERPL-MCNC: 9 MG/DL (ref 6–24)
BUN/CREAT SERPL: 13 (ref 9–23)
CALCIUM SERPL-MCNC: 9.4 MG/DL (ref 8.7–10.2)
CHLORIDE SERPL-SCNC: 98 MMOL/L (ref 96–106)
CHOLEST SERPL-MCNC: 197 MG/DL (ref 100–199)
CO2 SERPL-SCNC: 26 MMOL/L (ref 20–29)
CREAT SERPL-MCNC: 0.68 MG/DL (ref 0.57–1)
EGFRCR-CYS SERPLBLD CKD-EPI 2021: 106 ML/MIN/1.73
EOSINOPHIL # BLD AUTO: 0.2 X10E3/UL (ref 0–0.4)
EOSINOPHIL NFR BLD AUTO: 3 %
ERYTHROCYTE [DISTWIDTH] IN BLOOD BY AUTOMATED COUNT: 13.9 % (ref 11.7–15.4)
GLOBULIN SER CALC-MCNC: 2.7 G/DL (ref 1.5–4.5)
GLUCOSE SERPL-MCNC: 213 MG/DL (ref 65–99)
HCT VFR BLD AUTO: 42.2 % (ref 34–46.6)
HDLC SERPL-MCNC: 52 MG/DL
HGB BLD-MCNC: 14.1 G/DL (ref 11.1–15.9)
IMM GRANULOCYTES # BLD AUTO: 0 X10E3/UL (ref 0–0.1)
IMM GRANULOCYTES NFR BLD AUTO: 0 %
LDLC SERPL CALC-MCNC: 121 MG/DL (ref 0–99)
LYMPHOCYTES # BLD AUTO: 2.3 X10E3/UL (ref 0.7–3.1)
LYMPHOCYTES NFR BLD AUTO: 32 %
MCH RBC QN AUTO: 28.3 PG (ref 26.6–33)
MCHC RBC AUTO-ENTMCNC: 33.4 G/DL (ref 31.5–35.7)
MCV RBC AUTO: 85 FL (ref 79–97)
MONOCYTES # BLD AUTO: 0.5 X10E3/UL (ref 0.1–0.9)
MONOCYTES NFR BLD AUTO: 7 %
NEUTROPHILS # BLD AUTO: 4 X10E3/UL (ref 1.4–7)
NEUTROPHILS NFR BLD AUTO: 57 %
PLATELET # BLD AUTO: 289 X10E3/UL (ref 150–450)
POTASSIUM SERPL-SCNC: 4.1 MMOL/L (ref 3.5–5.2)
PROT SERPL-MCNC: 7.2 G/DL (ref 6–8.5)
RBC # BLD AUTO: 4.99 X10E6/UL (ref 3.77–5.28)
SODIUM SERPL-SCNC: 137 MMOL/L (ref 134–144)
TRIGL SERPL-MCNC: 137 MG/DL (ref 0–149)
VLDLC SERPL CALC-MCNC: 24 MG/DL (ref 5–40)
WBC # BLD AUTO: 7.2 X10E3/UL (ref 3.4–10.8)

## 2022-09-16 ENCOUNTER — DOCUMENTATION (OUTPATIENT)
Dept: PRIMARY CARE | Facility: CLINIC | Age: 52
End: 2022-09-16
Payer: COMMERCIAL

## 2022-09-16 DIAGNOSIS — R73.01 ELEVATED FASTING GLUCOSE: Primary | ICD-10-CM

## 2022-09-16 NOTE — PROGRESS NOTES
Care Gap Team has outreached to Eldahowie Elizondoca Veena on behalf of their primary care provider.      Care Gap Source: [facility]    Care Gap(s) Identified: Colorectal Cancer Screening      Chart Review Completed: Yes        Pt was seen in office on 9/14/2022 and direct access colonoscopy referral was given to pt. No outreach needed.

## 2022-09-19 ENCOUNTER — TELEPHONE (OUTPATIENT)
Dept: PRIMARY CARE | Facility: CLINIC | Age: 52
End: 2022-09-19
Payer: COMMERCIAL

## 2022-09-22 LAB — HBA1C MFR BLD: 9.5 % (ref 4.8–5.6)

## 2022-09-23 ENCOUNTER — OFFICE VISIT (OUTPATIENT)
Dept: PRIMARY CARE | Facility: CLINIC | Age: 52
End: 2022-09-23
Payer: COMMERCIAL

## 2022-09-23 VITALS
RESPIRATION RATE: 16 BRPM | DIASTOLIC BLOOD PRESSURE: 60 MMHG | HEIGHT: 63 IN | BODY MASS INDEX: 29.52 KG/M2 | WEIGHT: 166.6 LBS | SYSTOLIC BLOOD PRESSURE: 120 MMHG | HEART RATE: 89 BPM | OXYGEN SATURATION: 98 % | TEMPERATURE: 98 F

## 2022-09-23 DIAGNOSIS — I10 HYPERTENSION, UNSPECIFIED TYPE: ICD-10-CM

## 2022-09-23 DIAGNOSIS — E11.9 DIABETES MELLITUS TYPE 2 IN NONOBESE (CMS/HCC): Primary | ICD-10-CM

## 2022-09-23 PROCEDURE — 99214 OFFICE O/P EST MOD 30 MIN: CPT | Performed by: FAMILY MEDICINE

## 2022-09-23 PROCEDURE — 3008F BODY MASS INDEX DOCD: CPT | Performed by: FAMILY MEDICINE

## 2022-09-23 RX ORDER — METFORMIN HYDROCHLORIDE 500 MG/1
500 TABLET ORAL 2 TIMES DAILY WITH MEALS
Qty: 180 TABLET | Refills: 1 | Status: SHIPPED | OUTPATIENT
Start: 2022-09-23 | End: 2023-01-19 | Stop reason: SINTOL

## 2022-09-23 ASSESSMENT — ENCOUNTER SYMPTOMS: ENDOCRINE COMMENTS: TYPE 2 DIABETES.

## 2022-09-23 ASSESSMENT — PATIENT HEALTH QUESTIONNAIRE - PHQ9: SUM OF ALL RESPONSES TO PHQ9 QUESTIONS 1 & 2: 0

## 2022-09-23 NOTE — ASSESSMENT & PLAN NOTE
Will start on metformin 500mg bid  Refer to diabetic teaching classes   Patient to find out which glucometer covered by her insurance.

## 2022-09-23 NOTE — PROGRESS NOTES
Subjective      Patient ID: Elda Curiel is a 52 y.o. female.  1970      HPI Here for followup lab work. HgbA1c is 9.5.  Needs medication    The following have been reviewed and updated as appropriate in this visit:          Review of Systems   Endocrine:        Type 2 diabetes.   All other systems reviewed and are negative.      Active Ambulatory Problems     Diagnosis Date Noted    HTN (hypertension) 2019    Fatty liver 2019    ESTER (obstructive sleep apnea) 2019    Elevated fasting glucose 2020    Elevated LFTs 2020    Diabetes mellitus type 2 in nonobese (CMS/HCC) 2022     Resolved Ambulatory Problems     Diagnosis Date Noted    No Resolved Ambulatory Problems     Past Medical History:   Diagnosis Date    Hypertension        Current Outpatient Medications:     metFORMIN (GLUCOPHAGE) 500 mg tablet, Take 1 tablet (500 mg total) by mouth 2 (two) times a day with meals., Disp: 180 tablet, Rfl: 1    valACYclovir (VALTREX) 1 gram tablet, 2 tabs po BID x 2 doses prn cold sores, Disp: 30 tablet, Rfl: 0    omeprazole (PriLOSEC) 20 mg capsule, Take 1 capsule (20 mg total) by mouth daily before breakfast. (Patient not taking: Reported on 2022), Disp: 90 capsule, Rfl: 1  No Known Allergies  Social History     Socioeconomic History    Marital status:      Spouse name: None    Number of children: None    Years of education: None    Highest education level: None   Tobacco Use    Smoking status: Never Smoker    Smokeless tobacco: Never Used   Substance and Sexual Activity    Alcohol use: Yes    Drug use: No     Family History   Problem Relation Age of Onset    Breast cancer Biological Mother     Hodgkin's lymphoma Biological Father        Past Surgical History:   Procedure Laterality Date     SECTION      TUBAL LIGATION         Objective     Vitals:    22 1143   BP: 120/60   BP Location: Left upper arm   Patient Position: Sitting  "  Pulse: 89   Resp: 16   Temp: 36.7 °C (98 °F)   TempSrc: Skin   SpO2: 98%   Weight: 75.6 kg (166 lb 9.6 oz)   Height: 1.607 m (5' 3.25\")     Body mass index is 29.28 kg/m².    Physical Exam  Vitals and nursing note reviewed.   HENT:      Head: Normocephalic.      Right Ear: Tympanic membrane normal.      Left Ear: Tympanic membrane normal.      Nose: Nose normal.      Mouth/Throat:      Mouth: Mucous membranes are moist.   Eyes:      Pupils: Pupils are equal, round, and reactive to light.   Cardiovascular:      Rate and Rhythm: Normal rate and regular rhythm.      Pulses: Normal pulses.   Pulmonary:      Effort: Pulmonary effort is normal.   Abdominal:      General: Abdomen is flat.   Musculoskeletal:         General: Normal range of motion.      Comments: Diabetes type 2.   Neurological:      General: No focal deficit present.      Mental Status: She is alert and oriented to person, place, and time.   Psychiatric:         Mood and Affect: Mood normal.         Assessment/Plan   Diagnoses and all orders for this visit:    Diabetes mellitus type 2 in nonobese (CMS/HCC) (Primary)  Assessment & Plan:  Will start on metformin 500mg bid  Refer to diabetic teaching classes   Patient to find out which glucometer covered by her insurance.    Orders:  -     metFORMIN (GLUCOPHAGE) 500 mg tablet; Take 1 tablet (500 mg total) by mouth 2 (two) times a day with meals.  -     Ambulatory referral to Diabetic Education; Future    Hypertension, unspecified type  Assessment & Plan:  Stable with lifestyle changes.        "

## 2022-09-26 RX ORDER — LANCETS
EACH MISCELLANEOUS
Qty: 3 EACH | Refills: 0 | Status: SHIPPED | OUTPATIENT
Start: 2022-09-26 | End: 2022-10-20

## 2022-09-26 RX ORDER — LANCETS
1 EACH MISCELLANEOUS 2 TIMES DAILY
Qty: 100 EACH | Refills: 3 | Status: SHIPPED | OUTPATIENT
Start: 2022-09-26

## 2022-09-27 ENCOUNTER — TELEPHONE (OUTPATIENT)
Dept: PRIMARY CARE | Facility: CLINIC | Age: 52
End: 2022-09-27
Payer: COMMERCIAL

## 2022-10-11 ENCOUNTER — OFFICE VISIT (OUTPATIENT)
Dept: NUTRITION | Facility: HOSPITAL | Age: 52
End: 2022-10-11
Attending: FAMILY MEDICINE
Payer: COMMERCIAL

## 2022-10-11 VITALS — HEIGHT: 64 IN | WEIGHT: 163 LBS | BODY MASS INDEX: 27.83 KG/M2

## 2022-10-11 DIAGNOSIS — E11.9 DIABETES MELLITUS TYPE 2 IN NONOBESE (CMS/HCC): ICD-10-CM

## 2022-10-11 PROCEDURE — G0108 DIAB MANAGE TRN  PER INDIV: HCPCS

## 2022-10-11 NOTE — LETTER
"October 11, 2022     Rc Whitten, DO  [address]  [place] [address]    Patient: Elda Curiel   YOB: 1970   Date of Visit: 10/11/2022       Dear Dr. Whitten:    Thank you for referring Elda Curiel to me for evaluation. Below are my notes for this consultation.    If you have questions, please do not hesitate to call me. I look forward to following your patient along with you.         Sincerely,        Tana Blood        CC: No Recipients    Diabetes Management Program    Subjective      Patient ID: Ms. Elda Curiel is a 52 y.o. female who has been referred for diabetes assessment. Newly recognized diabetes A1c=9.5%. States past \"pre-diabetes\" range reversed with diet exercise. Recent Covid July 2022,  states second time getting virus-addressed inflammatory effect related to diabetes.       Diabetes Medication/Instruction:   Current Outpatient Medications:     metFORMIN (GLUCOPHAGE) 500 mg tablet, Take 1 tablet (500 mg total) by mouth 2 (two) times a day with meals., Disp: 180 tablet, Rfl: 1    valACYclovir (VALTREX) 1 gram tablet, 2 tabs po BID x 2 doses prn cold sores, Disp: 30 tablet, Rfl: 0    lancets (onetouch ultrasoft) misc, Test daily before all meals/snacks and once before bedtime., Disp: 3 each, Rfl: 0    lancets (onetouch ultrasoft) misc, Test daily before all meals/snacks and once before bedtime., Disp: 3 each, Rfl: 0    lancets (onetouch ultrasoft) misc, 1 lancet 2 (two) times a day., Disp: 100 each, Rfl: 3    omeprazole (PriLOSEC) 20 mg capsule, Take 1 capsule (20 mg total) by mouth daily before breakfast. (Patient not taking: No sig reported), Disp: 90 capsule, Rfl: 1   Medications: Metformin instructed actions/tming no reported GI Se states initially has since resolved.     SBGM:  Meter One Touch Verio  using    Schedule:  7 days per week     1 Tests per day before meals only  Comments: She states testing 1-2 times a day running " "out of supplies script written for once daily.She has addressed with provider script for increase freq of testing. Per pt provider directed to check once daily. Suggest testing at varied times to see overall data. FBS are typically elevated 148-18 5mg/dL mg/dL,  post prandial 1 or 2 hour 100-120 mg/dL range.    Physicial Activity: Discussed benefits of exercise and various options  Comments: No limitations/restriction walks 1-5 miles 4 times/week recommend at least 30 min 5 days a week no more than skipping 2 day a time. Suggestions offered for you tube walking fit videos if weather prohibitive.     Meal Planning: Instructed basics of carb counting, basic portions, meal spacing. Instructed how to interpret nutrition facts for diabetes-total grams of carbs, sugar free does not meal carb free. She has eliminated all reg sweet drinks-drank mod amount sweet tea until last Covid occurrence/ diagnosis. She has been severely restricting carbs since diagnosis ~3-15 gr at meals at meals. Describes herself as picky eater likes carbs not big breakfast eater has started having [employer] Protein shake for BF Other options Whole wheat english muffin with PB suggested.   Introduced to [employer],  Eating well website /magazine-recipies.   She stated \"self diagnosed\" with irritable bowl has varied GI complaints nausea, intermittent diarrhea constipation which she states are improiving.        Plan: Follow up with RD   Goals      Follow your diet plan as advised by your nutritionist      Eat within first 1 1/2 hours of waking  ~30 gr CHO at each meal   Choose low carb snacks~5-15 gr   Always include a protein with carbs        Increase physical activity       30 minutes 5 days a week       Record your blood sugar as directed          Barriers:  No identified barriers    Plans to overcome barriers: Written materials    Teaching directed to:patient 60 minutes spent with this patient.Tana Blood RN BSN, [facility]        "

## 2022-10-11 NOTE — PROGRESS NOTES
"Diabetes Management Program    Subjective      Patient ID: Ms. Elda Curiel is a 52 y.o. female who has been referred for diabetes assessment. Newly recognized diabetes A1c=9.5%. States past \"pre-diabetes\" range reversed with diet exercise. Recent Covid July 2022,  states second time getting virus-addressed inflammatory effect related to diabetes.       Diabetes Medication/Instruction:   Current Outpatient Medications:     metFORMIN (GLUCOPHAGE) 500 mg tablet, Take 1 tablet (500 mg total) by mouth 2 (two) times a day with meals., Disp: 180 tablet, Rfl: 1    valACYclovir (VALTREX) 1 gram tablet, 2 tabs po BID x 2 doses prn cold sores, Disp: 30 tablet, Rfl: 0    lancets (onetouch ultrasoft) misc, Test daily before all meals/snacks and once before bedtime., Disp: 3 each, Rfl: 0    lancets (onetouch ultrasoft) misc, Test daily before all meals/snacks and once before bedtime., Disp: 3 each, Rfl: 0    lancets (onetouch ultrasoft) misc, 1 lancet 2 (two) times a day., Disp: 100 each, Rfl: 3    omeprazole (PriLOSEC) 20 mg capsule, Take 1 capsule (20 mg total) by mouth daily before breakfast. (Patient not taking: No sig reported), Disp: 90 capsule, Rfl: 1   Medications: Metformin instructed actions/tming no reported GI Se states initially has since resolved.     SBGM:  Meter One Touch Verio  using    Schedule:  7 days per week     1 Tests per day before meals only  Comments: She states testing 1-2 times a day running out of supplies script written for once daily.She has addressed with provider script for increase freq of testing. Per pt provider directed to check once daily. Suggest testing at varied times to see overall data. FBS are typically elevated 148-18 5mg/dL mg/dL,  post prandial 1 or 2 hour 100-120 mg/dL range.    Physicial Activity: Discussed benefits of exercise and various options  Comments: No limitations/restriction walks 1-5 miles 4 times/week recommend at least 30 min 5 days a week no more " "than skipping 2 day a time. Suggestions offered for you tube walking fit videos if weather prohibitive.     Meal Planning: Instructed basics of carb counting, basic portions, meal spacing. Instructed how to interpret nutrition facts for diabetes-total grams of carbs, sugar free does not meal carb free. She has eliminated all reg sweet drinks-drank mod amount sweet tea until last Covid occurrence/ diagnosis. She has been severely restricting carbs since diagnosis ~3-15 gr at meals at meals. Describes herself as picky eater likes carbs not big breakfast eater has started having [employer] Protein shake for BF Other options Whole wheat english muffin with PB suggested.   Introduced to [employer],  Eating well website /magazine-recipies.   She stated \"self diagnosed\" with irritable bowl has varied GI complaints nausea, intermittent diarrhea constipation which she states are improiving.        Plan: Follow up with RD   Goals      Follow your diet plan as advised by your nutritionist      Eat within first 1 1/2 hours of waking  ~30 gr CHO at each meal   Choose low carb snacks~5-15 gr   Always include a protein with carbs        Increase physical activity       30 minutes 5 days a week       Record your blood sugar as directed          Barriers:  No identified barriers    Plans to overcome barriers: Written materials    Teaching directed to:patient 60 minutes spent with this patient.Tana Blood RN BSN, CDCES      "

## 2022-10-17 ENCOUNTER — OFFICE VISIT (OUTPATIENT)
Dept: NUTRITION | Facility: HOSPITAL | Age: 52
End: 2022-10-17
Attending: FAMILY MEDICINE
Payer: COMMERCIAL

## 2022-10-17 VITALS — BODY MASS INDEX: 27.66 KG/M2 | HEIGHT: 64 IN | WEIGHT: 162 LBS

## 2022-10-17 DIAGNOSIS — E11.9 DIABETES MELLITUS TYPE 2 IN NONOBESE (CMS/HCC): ICD-10-CM

## 2022-10-17 PROCEDURE — 97802 MEDICAL NUTRITION INDIV IN: CPT

## 2022-10-17 NOTE — PROGRESS NOTES
"           Subjective      Patient ID: Elda Curiel is a 52 y.o. female.  1970      HPI    The following have been reviewed and updated as appropriate in this visit:   Problems       Review of Systems    Objective     Vitals:    10/17/22 1329   Weight: 73.5 kg (162 lb)   Height: 1.626 m (5' 4\")     Body mass index is 27.81 kg/m².    Physical Exam    Assessment/Plan   Diagnoses and all orders for this visit:    Diabetes mellitus type 2 in nonobese (CMS/[facility])             Medical Nutrition Therapy Care Plan    Elda is interested in discussing: How to eat for DM. Is picky eater. New dx DM. Met with DM nurse- not having issues checking BS's. Was only doing fasting, now doing pp as well. ~2hrs <120. Having more trouble with fasting- best has been 115; regularly 130-150's. Is not feeling great- regular nausea. Got covid in July- caused unintentional weight loss d/t nausea.     Medication adherence has been good. Has been adjusting nighttime dose of metformin to try and help with fasting.        Vitals:    10/17/22 1329   Weight: 73.5 kg (162 lb)   Height: 1.626 m (5' 4\")         Body mass index is 27.81 kg/m².      BMI Readings from Last 1 Encounters:   10/17/22 27.81 kg/m²         Wt Readings from Last 3 Encounters:   10/17/22 73.5 kg (162 lb)   10/11/22 73.9 kg (163 lb)   09/23/22 75.6 kg (166 lb 9.6 oz)      Ht: 64\" CBW: 162# (today, stated)  Wt hx: Always had a lot of flux. In 30's weight was 135#; when pregnant had lost weight. Weight of 169# ~5/6 years, lost weight via [employer]ics- 140#. Crept back up and then covid and gained - got up to 180#. Got covid in July- lost 20# in last 4 months.   Occupation: No - is fairly busy with errands/house work. Not sitting much.     GI Problems: Nausea - daily since July. Hx diarrhea since a young child. Has improved more recently- may be due to diet changes   Supplements: none  Abnormal Labs: A1C- 9.5   Sleep:~7-8 hrs/night. Sleeps well. Dx sleep apnea, but has cpap " which works well   Smoke: no  Drink: 1x/week- wine. Since July has cut back. Prior would drink during the week.   Stress: med- feels like she 'stresses herself out' even if there's not much going on.   Exercise: walking - recently worked up to 5x/week. Usually 3 miles; sometimes will do more.      Comments: states picky eater and likes a lot of carb foods. Has been making a lot of diet changes recently.     Typical Day: Wakes up @7am     B @ 7:30am: used to skip. Now is doing 2 slices [employer] killer bread with some ~2tbsp PB; or Novaforaade shake.   S @ :   L @ 12pm: roasted veggies (peppers, zucc, onions) with some sausage. Recently started adding in ~3/4c rice. Might go out and have a salad with salmon   S @ : usually nothing. Other day had some celery and hummus.   D @ : similar to lunch. Doesn't cook regulally.  Chicken, stuffed peppers; leftovers from lunch; fish; pasta.      S @ :      Fluid intake: Drinks water, but during covid got back into iced teas. Has been switching to unsweetened  Meal Prep Challenges: is on the go with son/sports a lot.         Nutrition Diagnosis:    P - Nutrition/food knowledge deficit related to  E - new Dx T2Dm as evidenced by   S- A1C 9.5       Estimated Nutrition Needs     Energy: 1844 kcal/day based on MSJ  Protein: 115 g/day   Fluid: 64 ounces of water        Reviewed:   - Basic macronutrient breakdown (edu provided)  - How to read a food label (edu provided)  - How to measure carbohydrate portions (edu provided)  - Pairing macronutrients       Goals and Recommendations:   - Always pair a carb with a protein/healthy fat  - Continue with regular exercise  - Cut out all sugary beverages. Switch to: unsweetened teas, seltzer water, sugar free flavoring  - Aim for 30-40gm carbs at meals; 5-15gm at snacks.     Weight Goal:  __ Maintain wt  _x_ Promote gradual loss   __ Promote gradual gain        Monitor and Evaluate  __ Monitor nutritional status             _x_ PO intake,  diet tolerance  _x_ Labs: A1C    _x_ Weight  __ Tube feeding/TPN  __ Need for additional diet education  __ Other    Problem list, medication list, & allergies reviewed only withing scope of medical nutrition therapy.       Follow up: Monday, Nov 14th -10:15am @ [facility]     E-mail address: [email or web address]    Time Spent: 60 minutes   Signature: YUNG Gardner

## 2022-10-20 PROBLEM — R73.01 ELEVATED FASTING GLUCOSE: Status: RESOLVED | Noted: 2020-01-02 | Resolved: 2022-10-20

## 2022-10-20 RX ORDER — LANCETS 30 GAUGE
EACH MISCELLANEOUS
COMMUNITY
Start: 2022-09-26

## 2022-10-20 RX ORDER — BLOOD-GLUCOSE METER
EACH MISCELLANEOUS
COMMUNITY
Start: 2022-09-26 | End: 2022-10-21 | Stop reason: SDUPTHER

## 2022-10-21 ENCOUNTER — OFFICE VISIT (OUTPATIENT)
Dept: PRIMARY CARE | Facility: CLINIC | Age: 52
End: 2022-10-21
Payer: COMMERCIAL

## 2022-10-21 VITALS
SYSTOLIC BLOOD PRESSURE: 135 MMHG | BODY MASS INDEX: 27.81 KG/M2 | WEIGHT: 162 LBS | HEART RATE: 74 BPM | DIASTOLIC BLOOD PRESSURE: 80 MMHG

## 2022-10-21 DIAGNOSIS — E11.9 DIABETES MELLITUS TYPE 2 IN NONOBESE (CMS/HCC): Primary | ICD-10-CM

## 2022-10-21 PROCEDURE — 3008F BODY MASS INDEX DOCD: CPT | Performed by: FAMILY MEDICINE

## 2022-10-21 PROCEDURE — 99213 OFFICE O/P EST LOW 20 MIN: CPT | Performed by: FAMILY MEDICINE

## 2022-10-21 RX ORDER — BLOOD-GLUCOSE METER
EACH MISCELLANEOUS
Qty: 60 STRIP | Refills: 5 | Status: SHIPPED | OUTPATIENT
Start: 2022-10-21 | End: 2023-04-28

## 2022-10-21 ASSESSMENT — ENCOUNTER SYMPTOMS
DIZZINESS: 0
PALPITATIONS: 0
VOMITING: 0
CONFUSION: 0
DYSPHORIC MOOD: 0
POLYPHAGIA: 0
DYSURIA: 0
WHEEZING: 0
STRIDOR: 0
CHILLS: 0
NAUSEA: 1
BLOOD IN STOOL: 0
FEVER: 0
POLYDIPSIA: 0
CONSTIPATION: 0
ABDOMINAL PAIN: 0
NUMBNESS: 0
SHORTNESS OF BREATH: 0
HEADACHES: 0
COUGH: 0
DIARRHEA: 0
HEMATURIA: 0
WEAKNESS: 0

## 2022-10-21 NOTE — ASSESSMENT & PLAN NOTE
Diab Diet  Regular exercise  Weight loss  See Ophthal yearly  Same med  Lab in 2 months  Monitor bs

## 2022-10-21 NOTE — PROGRESS NOTES
Subjective      Patient ID: Elda Curiel is a 52 y.o. female.  1970      F/u dm  Doing well  bs this am 130      The following have been reviewed and updated as appropriate in this visit:   Allergies  Meds  Problems       Review of Systems   Constitutional: Negative for chills and fever.   HENT: Negative for dental problem and hearing loss.    Eyes: Negative for visual disturbance.   Respiratory: Negative for cough, shortness of breath, wheezing and stridor.    Cardiovascular: Negative for chest pain, palpitations and leg swelling.   Gastrointestinal: Positive for nausea. Negative for abdominal pain, blood in stool, constipation, diarrhea and vomiting.   Endocrine: Negative for cold intolerance, heat intolerance, polydipsia, polyphagia and polyuria.   Genitourinary: Negative for dysuria, hematuria, vaginal bleeding and vaginal discharge.   Musculoskeletal: Negative for gait problem.   Skin: Negative for rash.   Neurological: Negative for dizziness, syncope, weakness, numbness and headaches.   Psychiatric/Behavioral: Negative for confusion and dysphoric mood.       Objective     Vitals:    10/21/22 1057   BP: 135/80   BP Location: Right upper arm   Patient Position: Sitting   Pulse: 74   Weight: 73.5 kg (162 lb)     Body mass index is 27.81 kg/m².    Physical Exam  Vitals and nursing note reviewed.   Constitutional:       Appearance: Normal appearance. She is well-developed.   HENT:      Head: Normocephalic and atraumatic.      Right Ear: External ear normal.      Left Ear: External ear normal.      Nose: Nose normal.   Eyes:      General: No scleral icterus.     Conjunctiva/sclera: Conjunctivae normal.      Pupils: Pupils are equal, round, and reactive to light.   Neck:      Thyroid: No thyromegaly.      Vascular: No JVD.      Trachea: No tracheal deviation.   Cardiovascular:      Rate and Rhythm: Normal rate and regular rhythm.      Heart sounds: Normal heart sounds. No murmur heard.    No  friction rub. No gallop.   Pulmonary:      Effort: Pulmonary effort is normal.      Breath sounds: Normal breath sounds.   Abdominal:      General: Bowel sounds are normal. There is no distension.      Palpations: Abdomen is soft. There is no mass.      Tenderness: There is no abdominal tenderness. There is no guarding or rebound.      Hernia: No hernia is present.   Musculoskeletal:      Right lower leg: No edema.      Left lower leg: No edema.   Skin:     General: Skin is warm and dry.      Findings: No rash.   Neurological:      General: No focal deficit present.      Mental Status: She is alert and oriented to person, place, and time.   Psychiatric:         Mood and Affect: Mood normal.         Behavior: Behavior normal.         Assessment/Plan   Diagnoses and all orders for this visit:    Diabetes mellitus type 2 in nonobese (CMS/[facility]) (Primary)  Assessment & Plan:  Diab Diet  Regular exercise  Weight loss  See Ophthal yearly  Same med  Lab in 2 months  Monitor bs    Orders:  -     Basic metabolic panel; Future  -     Hemoglobin A1c; Future  -     Urinalysis with Reflex Culture; Future  -     Microalbumin / creatinine urine ratio; Future    Other orders  -     ONETOUCH VERIO TEST STRIPS strip; TEST BID

## 2023-01-11 ENCOUNTER — TELEPHONE (OUTPATIENT)
Dept: PRIMARY CARE | Facility: CLINIC | Age: 53
End: 2023-01-11

## 2023-01-11 NOTE — TELEPHONE ENCOUNTER
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

## 2023-01-11 NOTE — TELEPHONE ENCOUNTER
Metformin has been giving her stomach issues but since now back at work full time she does think this will work due to stomach issues -  Blood work being done in AM tomorrow so can see labs and switch medications are directed

## 2023-01-13 LAB
ALBUMIN/CREAT UR: 5 MG/G CREAT (ref 0–29)
APPEARANCE UR: CLEAR
BACTERIA #/AREA URNS HPF: ABNORMAL /[HPF]
BILIRUB UR QL STRIP: NEGATIVE
BUN SERPL-MCNC: 13 MG/DL (ref 6–24)
BUN/CREAT SERPL: 18 (ref 9–23)
CALCIUM SERPL-MCNC: 9.2 MG/DL (ref 8.7–10.2)
CASTS URNS QL MICRO: ABNORMAL /LPF
CHLORIDE SERPL-SCNC: 101 MMOL/L (ref 96–106)
CO2 SERPL-SCNC: 24 MMOL/L (ref 20–29)
COLOR UR: YELLOW
CREAT SERPL-MCNC: 0.72 MG/DL (ref 0.57–1)
CREAT UR-MCNC: 116.7 MG/DL
CRYSTALS URNS MICRO: ABNORMAL
EGFRCR SERPLBLD CKD-EPI 2021: 102 ML/MIN/1.73
EPI CELLS #/AREA URNS HPF: ABNORMAL /HPF (ref 0–10)
GLUCOSE SERPL-MCNC: 130 MG/DL (ref 70–99)
GLUCOSE UR QL STRIP: NEGATIVE
HBA1C MFR BLD: 6.1 % (ref 4.8–5.6)
HGB UR QL STRIP: NEGATIVE
KETONES UR QL STRIP: NEGATIVE
LAB CORP URINALYSIS REFLEX: NORMAL
LEUKOCYTE ESTERASE UR QL STRIP: NEGATIVE
MICRO URNS: NORMAL
MICRO URNS: NORMAL
MICROALBUMIN UR-MCNC: 5.4 UG/ML
NITRITE UR QL STRIP: NEGATIVE
PH UR STRIP: 5.5 [PH] (ref 5–7.5)
POTASSIUM SERPL-SCNC: 4.6 MMOL/L (ref 3.5–5.2)
PROT UR QL STRIP: NEGATIVE
RBC #/AREA URNS HPF: ABNORMAL /HPF (ref 0–2)
SODIUM SERPL-SCNC: 140 MMOL/L (ref 134–144)
SP GR UR STRIP: 1.02 (ref 1–1.03)
UNIDENT CRYS URNS QL MICRO: PRESENT
UROBILINOGEN UR STRIP-MCNC: 0.2 MG/DL (ref 0.2–1)
WBC #/AREA URNS HPF: ABNORMAL /HPF (ref 0–5)

## 2023-01-19 ENCOUNTER — OFFICE VISIT (OUTPATIENT)
Dept: PRIMARY CARE | Facility: CLINIC | Age: 53
End: 2023-01-19
Payer: COMMERCIAL

## 2023-01-19 VITALS
HEIGHT: 64 IN | BODY MASS INDEX: 28.58 KG/M2 | DIASTOLIC BLOOD PRESSURE: 78 MMHG | SYSTOLIC BLOOD PRESSURE: 118 MMHG | TEMPERATURE: 97.3 F | OXYGEN SATURATION: 98 % | RESPIRATION RATE: 16 BRPM | HEART RATE: 82 BPM | WEIGHT: 167.4 LBS

## 2023-01-19 DIAGNOSIS — K52.1 DIARRHEA DUE TO DRUG: Primary | ICD-10-CM

## 2023-01-19 DIAGNOSIS — E11.9 DIABETES MELLITUS TYPE 2 IN NONOBESE (CMS/HCC): ICD-10-CM

## 2023-01-19 PROCEDURE — 99213 OFFICE O/P EST LOW 20 MIN: CPT | Performed by: FAMILY MEDICINE

## 2023-01-19 PROCEDURE — 3008F BODY MASS INDEX DOCD: CPT | Performed by: FAMILY MEDICINE

## 2023-01-19 ASSESSMENT — ENCOUNTER SYMPTOMS
FATIGUE: 0
FEVER: 0
SHORTNESS OF BREATH: 0
ABDOMINAL PAIN: 0
CHILLS: 0
DIARRHEA: 1

## 2023-01-19 NOTE — TELEPHONE ENCOUNTER
Spoke w/ pharmacy. Requesting that semagutide 0.25ml pen be sent to pharmacy. They received a 3ml. Stated with starting dose usually sent in smaller dose.     Medicine Refill Request    Last Office: 1/19/2023   Last Consult Visit: Visit date not found  Last Telemedicine Visit: 2/23/2021 Rc Whitten,     Next Appointment: 2/8/2023      Current Outpatient Medications:   •  lancets (onetouch ultrasoft) misc, 1 lancet 2 (two) times a day., Disp: 100 each, Rfl: 3  •  ONETOUCH VERIO REFLECT METER misc, 2 (two) times a day. for testing, Disp: , Rfl:   •  ONETOUCH VERIO TEST STRIPS strip, TEST BID, Disp: 60 strip, Rfl: 5  •  semaglutide, Inject 0.25 mg under the skin every (seven) 7 days. Include 12  Needles -UPDATE, Disp: 3 mL, Rfl: 1  •  valACYclovir (VALTREX) 1 gram tablet, 2 tabs po BID x 2 doses prn cold sores, Disp: 30 tablet, Rfl: 0      BP Readings from Last 3 Encounters:   01/19/23 118/78   10/21/22 135/80   09/23/22 120/60       Recent Lab results:  Lab Results   Component Value Date    CHOL 197 09/14/2022   ,   Lab Results   Component Value Date    HDL 52 09/14/2022   ,   Lab Results   Component Value Date    LDLCALC 121 (H) 09/14/2022   ,   Lab Results   Component Value Date    TRIG 137 09/14/2022        Lab Results   Component Value Date    GLUCOSE 130 (H) 01/12/2023   ,   Lab Results   Component Value Date    HGBA1C 6.1 (H) 01/12/2023         Lab Results   Component Value Date    CREATININE 0.72 01/12/2023       Lab Results   Component Value Date    TSH 1.470 12/31/2019

## 2023-01-19 NOTE — PROGRESS NOTES
Patient ID: Elda Curiel is a 52 y.o. female.    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      Chief Complaint   Patient presents with   • Office visit     GI issue with the medication she's taking called metformin, started why at home but is very uncomfortable and unbearable at work       Patient Active Problem List   Diagnosis   • HTN (hypertension)   • Fatty liver   • ESTER (obstructive sleep apnea)   • Elevated LFTs   • Diabetes mellitus type 2 in nonobese (CMS/HCC)   • Diarrhea due to drug       Past Medical History:   Diagnosis Date   • Hypertension        Past Surgical History:   Procedure Laterality Date   •  SECTION     • TUBAL LIGATION         Family History   Problem Relation Age of Onset   • Breast cancer Biological Mother    • Hodgkin's lymphoma Biological Father        Social History     Socioeconomic History   • Marital status:      Spouse name: None   • Number of children: None   • Years of education: None   • Highest education level: None   Tobacco Use   • Smoking status: Never   • Smokeless tobacco: Never   Substance and Sexual Activity   • Alcohol use: Yes   • Drug use: No       Current Outpatient Medications   Medication Sig Dispense Refill   • lancets (onetouch ultrasoft) misc 1 lancet 2 (two) times a day. 100 each 3   • metFORMIN (GLUCOPHAGE) 500 mg tablet Take 1 tablet (500 mg total) by mouth 2 (two) times a day with meals. 180 tablet 1   • ONETOUCH VERIO REFLECT METER misc 2 (two) times a day. for testing     • ONETOUCH VERIO TEST STRIPS strip TEST BID 60 strip 5   • semaglutide Inject 0.25 mg under the skin every (seven) 7 days. Include 12  Needles -UPDATE 3 mL 1   • valACYclovir (VALTREX) 1 gram tablet 2 tabs po BID x 2 doses prn cold sores 30 tablet 0     No current facility-administered medications for this visit.       Barbara   Patient has no known  "allergies.      The following have been reviewed and updated as appropriate in this visit:        Review of Systems   Constitutional: Negative for chills, fatigue and fever.   Respiratory: Negative for shortness of breath.    Cardiovascular: Negative for chest pain.   Gastrointestinal: Positive for diarrhea. Negative for abdominal pain.       Objective     Vitals:    01/19/23 0759   BP: 118/78   BP Location: Right upper arm   Patient Position: Sitting   Pulse: 82   Resp: 16   Temp: 36.3 °C (97.3 °F)   TempSrc: Temporal   SpO2: 98%   Weight: 75.9 kg (167 lb 6.4 oz)   Height: 1.626 m (5' 4\")     Body mass index is 28.73 kg/m².    Physical Exam  Constitutional:       Appearance: Normal appearance.   Cardiovascular:      Rate and Rhythm: Normal rate and regular rhythm.   Pulmonary:      Effort: Pulmonary effort is normal.      Breath sounds: Normal breath sounds. No stridor.   Lymphadenopathy:      Cervical: No cervical adenopathy.   Neurological:      Mental Status: She is alert.           Assessment/Plan   Problem List Items Addressed This Visit        Digestive    Diarrhea due to drug - Primary       Endocrine/Metabolic    Diabetes mellitus type 2 in nonobese (CMS/[facility])    Relevant Medications    semaglutide     Stop metformin, ozempic low dose keep at .25 mg  Call Dr HOUGH 1 mo w bs results   Reviewed you tube instructions videow pt today  . Hope insurance will cove all second line meds expensive  Call if  Problems filling.  Might need autherization. If so  Use just 1 metofrmin if  Trouble filling   "

## 2023-02-08 ENCOUNTER — OFFICE VISIT (OUTPATIENT)
Dept: PRIMARY CARE | Facility: CLINIC | Age: 53
End: 2023-02-08
Payer: COMMERCIAL

## 2023-02-08 VITALS
TEMPERATURE: 98.2 F | WEIGHT: 165.6 LBS | DIASTOLIC BLOOD PRESSURE: 92 MMHG | HEART RATE: 96 BPM | HEIGHT: 64 IN | SYSTOLIC BLOOD PRESSURE: 144 MMHG | BODY MASS INDEX: 28.27 KG/M2 | OXYGEN SATURATION: 97 %

## 2023-02-08 DIAGNOSIS — F98.8 ATTENTION DEFICIT DISORDER, UNSPECIFIED HYPERACTIVITY PRESENCE: ICD-10-CM

## 2023-02-08 DIAGNOSIS — E11.9 DIABETES MELLITUS TYPE 2 IN NONOBESE (CMS/HCC): Primary | ICD-10-CM

## 2023-02-08 DIAGNOSIS — R03.0 WHITE COAT SYNDROME WITHOUT DIAGNOSIS OF HYPERTENSION: ICD-10-CM

## 2023-02-08 PROBLEM — I10 HTN (HYPERTENSION): Status: RESOLVED | Noted: 2019-09-06 | Resolved: 2023-02-08

## 2023-02-08 PROBLEM — K52.1 DIARRHEA DUE TO DRUG: Status: RESOLVED | Noted: 2023-01-19 | Resolved: 2023-02-08

## 2023-02-08 PROCEDURE — 99213 OFFICE O/P EST LOW 20 MIN: CPT | Performed by: FAMILY MEDICINE

## 2023-02-08 PROCEDURE — 3008F BODY MASS INDEX DOCD: CPT | Performed by: FAMILY MEDICINE

## 2023-02-08 RX ORDER — DEXTROAMPHETAMINE SACCHARATE, AMPHETAMINE ASPARTATE MONOHYDRATE, DEXTROAMPHETAMINE SULFATE AND AMPHETAMINE SULFATE 2.5; 2.5; 2.5; 2.5 MG/1; MG/1; MG/1; MG/1
10 CAPSULE, EXTENDED RELEASE ORAL EVERY MORNING
Qty: 30 CAPSULE | Refills: 0 | Status: SHIPPED | OUTPATIENT
Start: 2023-02-08 | End: 2023-04-04 | Stop reason: SDUPTHER

## 2023-02-08 ASSESSMENT — ENCOUNTER SYMPTOMS
CONFUSION: 0
DECREASED CONCENTRATION: 1
BLOOD IN STOOL: 0
POLYDIPSIA: 0
WHEEZING: 0
VOMITING: 0
SLEEP DISTURBANCE: 1
CONSTIPATION: 0
FEVER: 0
NUMBNESS: 0
DIARRHEA: 0
NAUSEA: 0
SORE THROAT: 0
PALPITATIONS: 0
COUGH: 0
WEAKNESS: 0
DYSURIA: 0
POLYPHAGIA: 0
SHORTNESS OF BREATH: 0
DYSPHORIC MOOD: 0
DIZZINESS: 0
HEMATURIA: 0
STRIDOR: 0
HEADACHES: 0
CHILLS: 0
ABDOMINAL PAIN: 0

## 2023-02-08 NOTE — PROGRESS NOTES
"      Subjective      Patient ID: Elda Curiel is a 52 y.o. female.  1970      Med ck      The following have been reviewed and updated as appropriate in this visit:   Problems       Review of Systems   Constitutional: Negative for chills and fever.   HENT: Negative for dental problem, hearing loss and sore throat.    Eyes: Negative for visual disturbance.   Respiratory: Negative for cough, shortness of breath, wheezing and stridor.    Cardiovascular: Negative for chest pain, palpitations and leg swelling.   Gastrointestinal: Negative for abdominal pain, blood in stool, constipation, diarrhea, nausea and vomiting.   Endocrine: Negative for cold intolerance, heat intolerance, polydipsia, polyphagia and polyuria.   Genitourinary: Negative for dysuria, hematuria, vaginal bleeding and vaginal discharge.   Musculoskeletal: Negative for gait problem.   Skin: Negative for rash.   Neurological: Negative for dizziness, syncope, weakness, numbness and headaches.   Psychiatric/Behavioral: Positive for decreased concentration and sleep disturbance. Negative for confusion and dysphoric mood.       Objective     Vitals:    02/08/23 1508   BP: (!) 144/92   Pulse: 96   Temp: 36.8 °C (98.2 °F)   SpO2: 97%   Weight: 75.1 kg (165 lb 9.6 oz)   Height: 1.626 m (5' 4\")     Body mass index is 28.43 kg/m².    Physical Exam  Vitals and nursing note reviewed.   Constitutional:       Appearance: Normal appearance.   Eyes:      General: No scleral icterus.  Cardiovascular:      Rate and Rhythm: Normal rate and regular rhythm.      Heart sounds: Normal heart sounds.   Pulmonary:      Effort: Pulmonary effort is normal.      Breath sounds: Normal breath sounds.   Musculoskeletal:      Right lower leg: No edema.      Left lower leg: No edema.   Skin:     General: Skin is warm and dry.      Findings: No rash.   Neurological:      General: No focal deficit present.      Mental Status: She is alert.   Psychiatric:         Mood and " Affect: Mood normal.         Behavior: Behavior normal.         Assessment/Plan   Diagnoses and all orders for this visit:    Diabetes mellitus type 2 in nonobese (CMS/[facility]) (Primary)  Comments:  bs good  incr Ozempic to 0.5 mg wkly  lab in 2 months    White coat syndrome without diagnosis of hypertension  Comments:  home monitor    Attention deficit disorder, unspecified hyperactivity presence  Comments:  adderall xr 10 mg    Other orders  -     semaglutide 0.25 mg or 0.5 mg(2 mg/1.5 mL) pen injector; Inject 0.5 mg under the skin every (seven) 7 days.  -     amphetamine-dextroamphetamine XR (ADDERALL XR) 10 mg 24 hr capsule; Take 1 capsule (10 mg total) by mouth every morning.

## 2023-02-11 NOTE — TELEPHONE ENCOUNTER
Pt states her prescription for Ozempic 1.2mg isn't at the pharmacy and needs to be rewritten. Please advise.    .

## 2023-04-05 RX ORDER — DEXTROAMPHETAMINE SACCHARATE, AMPHETAMINE ASPARTATE MONOHYDRATE, DEXTROAMPHETAMINE SULFATE AND AMPHETAMINE SULFATE 2.5; 2.5; 2.5; 2.5 MG/1; MG/1; MG/1; MG/1
10 CAPSULE, EXTENDED RELEASE ORAL EVERY MORNING
Qty: 30 CAPSULE | Refills: 0 | Status: SHIPPED | OUTPATIENT
Start: 2023-04-05 | End: 2023-05-31 | Stop reason: SDUPTHER

## 2023-04-05 NOTE — TELEPHONE ENCOUNTER
Medicine Refill Request    Last Office: 2/8/2023   Last Consult Visit: Visit date not found  Last Telemedicine Visit: 2/23/2021 Rc Whitten, DO    Next Appointment: Visit date not found      Current Outpatient Medications:   •  amphetamine-dextroamphetamine XR (ADDERALL XR) 10 mg 24 hr capsule, Take 1 capsule (10 mg total) by mouth every morning., Disp: 30 capsule, Rfl: 0  •  lancets (onetouch ultrasoft) misc, 1 lancet 2 (two) times a day., Disp: 100 each, Rfl: 3  •  ONETOUCH VERIO REFLECT METER misc, 2 (two) times a day. for testing, Disp: , Rfl:   •  ONETOUCH VERIO TEST STRIPS strip, TEST BID, Disp: 60 strip, Rfl: 5  •  semaglutide 0.25 mg or 0.5 mg(2 mg/1.5 mL) pen injector, Inject 0.5 mg under the skin every (seven) 7 days., Disp: 1.5 mL, Rfl: 0  •  valACYclovir (VALTREX) 1 gram tablet, 2 tabs po BID x 2 doses prn cold sores, Disp: 30 tablet, Rfl: 0      BP Readings from Last 3 Encounters:   02/08/23 (!) 144/92   01/19/23 118/78   10/21/22 135/80       Recent Lab results:  Lab Results   Component Value Date    CHOL 197 09/14/2022   ,   Lab Results   Component Value Date    HDL 52 09/14/2022   ,   Lab Results   Component Value Date    LDLCALC 121 (H) 09/14/2022   ,   Lab Results   Component Value Date    TRIG 137 09/14/2022        Lab Results   Component Value Date    GLUCOSE 130 (H) 01/12/2023   ,   Lab Results   Component Value Date    HGBA1C 6.1 (H) 01/12/2023         Lab Results   Component Value Date    CREATININE 0.72 01/12/2023       Lab Results   Component Value Date    TSH 1.470 12/31/2019

## 2023-04-06 ENCOUNTER — TELEPHONE (OUTPATIENT)
Dept: PRIMARY CARE | Facility: CLINIC | Age: 53
End: 2023-04-06
Payer: COMMERCIAL

## 2023-04-06 DIAGNOSIS — Z12.31 SCREENING MAMMOGRAM, ENCOUNTER FOR: Primary | ICD-10-CM

## 2023-04-10 ENCOUNTER — HOSPITAL ENCOUNTER (OUTPATIENT)
Dept: RADIOLOGY | Age: 53
Discharge: HOME | End: 2023-04-10
Attending: FAMILY MEDICINE
Payer: COMMERCIAL

## 2023-04-10 DIAGNOSIS — Z12.31 SCREENING MAMMOGRAM, ENCOUNTER FOR: ICD-10-CM

## 2023-04-10 PROCEDURE — 77067 SCR MAMMO BI INCL CAD: CPT

## 2023-04-12 DIAGNOSIS — E11.9 DIABETES MELLITUS TYPE 2 IN NONOBESE (CMS/HCC): Primary | ICD-10-CM

## 2023-04-28 RX ORDER — BLOOD-GLUCOSE METER
EACH MISCELLANEOUS
Qty: 50 STRIP | Refills: 7 | Status: SHIPPED | OUTPATIENT
Start: 2023-04-28

## 2023-05-01 ENCOUNTER — HOSPITAL ENCOUNTER (OUTPATIENT)
Facility: CLINIC | Age: 53
Discharge: HOME | End: 2023-05-01
Attending: FAMILY MEDICINE
Payer: COMMERCIAL

## 2023-05-01 VITALS
BODY MASS INDEX: 27.66 KG/M2 | WEIGHT: 162 LBS | HEIGHT: 64 IN | DIASTOLIC BLOOD PRESSURE: 107 MMHG | SYSTOLIC BLOOD PRESSURE: 163 MMHG | OXYGEN SATURATION: 98 % | RESPIRATION RATE: 16 BRPM | HEART RATE: 94 BPM | TEMPERATURE: 98.3 F

## 2023-05-01 DIAGNOSIS — S61.219A FINGER LACERATION, INITIAL ENCOUNTER: Primary | ICD-10-CM

## 2023-05-01 PROCEDURE — 99214 OFFICE O/P EST MOD 30 MIN: CPT | Performed by: FAMILY MEDICINE

## 2023-05-01 NOTE — ED NOTES
Bed: 18  Expected date:   Expected time:   Means of arrival: Car  Comments:     Shira Betancourt MA  05/01/23 3220

## 2023-05-01 NOTE — ED PROVIDER NOTES
History  Chief Complaint   Patient presents with   • Laceration     Finger, middle finger left hand, sliced while cutting chicken, today, right side nail bed     Elda is a 53 yo female presenting with L middle finger laceration, sustained while preparing a meal just prior to arrival.       History provided by:  Patient   used: No        Past Medical History:   Diagnosis Date   • HTN (hypertension) 2019   • Hypertension        Past Surgical History:   Procedure Laterality Date   •  SECTION     • TUBAL LIGATION         Family History   Problem Relation Age of Onset   • Breast cancer Biological Mother    • Hodgkin's lymphoma Biological Father        Social History     Tobacco Use   • Smoking status: Never   • Smokeless tobacco: Never   Vaping Use   • Vaping status: Never Used   Substance Use Topics   • Alcohol use: Yes   • Drug use: No       Review of Systems   Constitutional: Negative for chills, fatigue and fever.   Gastrointestinal: Negative for nausea and vomiting.   Skin: Positive for wound.   Neurological: Negative for weakness and numbness.       Physical Exam  ED Triage Vitals [23 1809]   Temp Heart Rate Resp BP SpO2   36.8 °C (98.3 °F) 94 16 (!) 163/107 98 %      Temp Source Heart Rate Source Patient Position BP Location FiO2 (%) (Set)   Temporal Monitor Sitting Left upper arm --       Physical Exam  Constitutional:       Appearance: Normal appearance.   Skin:     Findings: Signs of injury and wound present. Urticarial rash: Left 3rd finger J shaped laceration w/o nail involvement.           Neurological:      Mental Status: She is alert.           Procedures  Laceration Repair    Date/Time: 2023 9:39 AM    Performed by: Chiki Gary DO  Authorized by: Chiki Gary DO    Consent:     Consent obtained:  Verbal    Consent given by:  Patient    Risks, benefits, and alternatives were discussed: yes      Risks discussed:  Infection, need for additional repair,  nerve damage, poor wound healing and poor cosmetic result    Alternatives discussed:  No treatment and referral  Universal protocol:     Procedure explained and questions answered to patient or proxy's satisfaction: yes      Patient identity confirmed:  Verbally with patient  Anesthesia:     Anesthesia method:  Local infiltration    Local anesthetic:  Lidocaine 1% w/o epi  Laceration details:     Location:  Finger    Finger location:  L long finger  Exploration:     Hemostasis achieved with:  Direct pressure    Wound extent: no muscle damage noted, no nerve damage noted, no tendon damage noted and no vascular damage noted    Treatment:     Area cleansed with:  Anika-Clark  Skin repair:     Repair method:  Sutures    Suture size:  5-0    Suture technique:  Running and simple interrupted    Number of sutures:  6  Approximation:     Approximation:  Close  Repair type:     Repair type:  Simple  Post-procedure details:     Dressing:  Antibiotic ointment and adhesive bandage    Procedure completion:  Tolerated well, no immediate complications        UC Course       Medical Decision Making  Informed consent obtained  Wound cleaned and prepped for procedure.  Wound anesthetized with 1% Lidocaine without Epi  #5 running 5-0 and #1 interrupted sutures placed  Wound again cleaned and bandaged.                      Chiki Gary DO  05/02/23 0941

## 2023-05-01 NOTE — DISCHARGE INSTRUCTIONS
Keep wound clean and covered.  Leave bandage in place for 24-48hrs, then you may change the bandage once daily, more as needed.     You may wash your cut and shower, but DO NOT submerge you cut in water.       You may use topical antibiotic.     Return in 10-14  days for suture removal.     Monitor for signs and symptoms of infection (worsening pain, redness, tenderness, swelling).  Please return for care if these symptoms arise.     Please let us know about your experience today!   Your input is truly appreciated and helps Dr. Gary and your team at [facility] to continue to provide a superior level of service!   Get Well Soon!

## 2023-05-02 ENCOUNTER — TELEPHONE (OUTPATIENT)
Dept: PRIMARY CARE | Facility: CLINIC | Age: 53
End: 2023-05-02
Payer: COMMERCIAL

## 2023-05-02 ASSESSMENT — ENCOUNTER SYMPTOMS
VOMITING: 0
FEVER: 0
NUMBNESS: 0
CHILLS: 0
NAUSEA: 0
FATIGUE: 0
WEAKNESS: 0
WOUND: 1

## 2023-05-02 NOTE — TELEPHONE ENCOUNTER
Patient requested an appointment for suture removal for 05/11-05/12, called patient and scheduled her with Nicole on 05/11 at . Mirlande did not have anything.

## 2023-05-11 ENCOUNTER — OFFICE VISIT (OUTPATIENT)
Dept: PRIMARY CARE | Facility: CLINIC | Age: 53
End: 2023-05-11
Payer: COMMERCIAL

## 2023-05-11 VITALS
TEMPERATURE: 98.2 F | HEART RATE: 95 BPM | OXYGEN SATURATION: 97 % | SYSTOLIC BLOOD PRESSURE: 126 MMHG | DIASTOLIC BLOOD PRESSURE: 74 MMHG | RESPIRATION RATE: 18 BRPM

## 2023-05-11 DIAGNOSIS — S61.213D LACERATION OF LEFT MIDDLE FINGER WITHOUT FOREIGN BODY WITHOUT DAMAGE TO NAIL, SUBSEQUENT ENCOUNTER: ICD-10-CM

## 2023-05-11 DIAGNOSIS — B00.1 COLD SORE: ICD-10-CM

## 2023-05-11 DIAGNOSIS — Z48.02 VISIT FOR SUTURE REMOVAL: Primary | ICD-10-CM

## 2023-05-11 PROCEDURE — 99213 OFFICE O/P EST LOW 20 MIN: CPT | Mod: 25

## 2023-05-11 PROCEDURE — 3078F DIAST BP <80 MM HG: CPT

## 2023-05-11 PROCEDURE — 15853 REMOVAL SUTR/STAPL XREQ ANES: CPT

## 2023-05-11 PROCEDURE — 3074F SYST BP LT 130 MM HG: CPT

## 2023-05-11 RX ORDER — VALACYCLOVIR HYDROCHLORIDE 1 G/1
TABLET, FILM COATED ORAL
Qty: 30 TABLET | Refills: 0 | Status: SHIPPED | OUTPATIENT
Start: 2023-05-11 | End: 2023-07-24 | Stop reason: SDUPTHER

## 2023-05-11 NOTE — PROCEDURES
Suture removal    Date/Time: 5/11/2023 4:25 PM    Performed by: Nicole Martinez CRNP  Authorized by: Nicole Martinez CRNP    Consent:     Consent obtained:  Verbal    Consent given by:  Patient    Risks, benefits, and alternatives were discussed: yes      Risks discussed:  Bleeding, pain and wound separation    Alternatives discussed:  No treatment and delayed treatment  Universal protocol:     Procedure explained and questions answered to patient or proxy's satisfaction: yes      Patient identity confirmed:  Verbally with patient  Location:     Location:  Upper extremity    Upper extremity location:  Hand    Hand location:  L long finger  Procedure details:     Wound appearance:  No signs of infection, good wound healing, nontender and clean    Sutures removed: 1 running, 1 straight suture.    Number of staples removed:  0  Post-procedure details:     Post-procedure assessment: skin glue, bandage.    Procedure completion:  Tolerated well, no immediate complications

## 2023-05-31 RX ORDER — DEXTROAMPHETAMINE SACCHARATE, AMPHETAMINE ASPARTATE MONOHYDRATE, DEXTROAMPHETAMINE SULFATE AND AMPHETAMINE SULFATE 2.5; 2.5; 2.5; 2.5 MG/1; MG/1; MG/1; MG/1
10 CAPSULE, EXTENDED RELEASE ORAL EVERY MORNING
Qty: 30 CAPSULE | Refills: 0 | Status: SHIPPED | OUTPATIENT
Start: 2023-05-31 | End: 2023-07-24 | Stop reason: SDUPTHER

## 2023-06-02 LAB — HBA1C MFR BLD: 5 % (ref 4.8–5.6)

## 2023-07-24 DIAGNOSIS — B00.1 COLD SORE: ICD-10-CM

## 2023-07-24 NOTE — TELEPHONE ENCOUNTER
Medicine Refill Request    Last Office Visit: 5/11/2023   Last Consult Visit: Visit date not found  Last Telemedicine Visit: 2/23/2021 Rc Whitten, DO    Next Appointment: Visit date not found      Current Outpatient Medications:   •  amphetamine-dextroamphetamine XR (ADDERALL XR) 10 mg 24 hr capsule, Take 1 capsule (10 mg total) by mouth every morning., Disp: 30 capsule, Rfl: 0  •  lancets (onetouch ultrasoft) misc, 1 lancet 2 (two) times a day., Disp: 100 each, Rfl: 3  •  ONETOUCH VERIO REFLECT METER misc, 2 (two) times a day. for testing, Disp: , Rfl:   •  ONETOUCH VERIO TEST STRIPS strip, TEST TWICE A DAY, Disp: 50 strip, Rfl: 7  •  semaglutide (OZEMPIC) subcutaneous injection, Inject 1 mg under the skin every (seven) 7 days., Disp: 3 mL, Rfl: 1  •  valACYclovir (VALTREX) 1 gram tablet, 2 tabs po BID x 2 doses prn cold sores, Disp: 30 tablet, Rfl: 0      BP Readings from Last 3 Encounters:   05/11/23 126/74   05/01/23 (!) 163/107   02/08/23 (!) 144/92       Recent Lab results:  Lab Results   Component Value Date    CHOL 197 09/14/2022   ,   Lab Results   Component Value Date    HDL 52 09/14/2022   ,   Lab Results   Component Value Date    LDLCALC 121 (H) 09/14/2022   ,   Lab Results   Component Value Date    TRIG 137 09/14/2022        Lab Results   Component Value Date    GLUCOSE 130 (H) 01/12/2023   ,   Lab Results   Component Value Date    HGBA1C 5.0 06/01/2023         Lab Results   Component Value Date    CREATININE 0.72 01/12/2023       Lab Results   Component Value Date    TSH 1.470 12/31/2019           Lab Results   Component Value Date    HGBA1C 5.0 06/01/2023

## 2023-07-25 RX ORDER — DEXTROAMPHETAMINE SACCHARATE, AMPHETAMINE ASPARTATE MONOHYDRATE, DEXTROAMPHETAMINE SULFATE AND AMPHETAMINE SULFATE 2.5; 2.5; 2.5; 2.5 MG/1; MG/1; MG/1; MG/1
10 CAPSULE, EXTENDED RELEASE ORAL EVERY MORNING
Qty: 30 CAPSULE | Refills: 0 | Status: SHIPPED | OUTPATIENT
Start: 2023-07-25 | End: 2023-09-25 | Stop reason: SDUPTHER

## 2023-07-25 RX ORDER — VALACYCLOVIR HYDROCHLORIDE 1 G/1
TABLET, FILM COATED ORAL
Qty: 30 TABLET | Refills: 0 | Status: SHIPPED | OUTPATIENT
Start: 2023-07-25 | End: 2024-10-02 | Stop reason: SDUPTHER

## 2023-07-25 NOTE — TELEPHONE ENCOUNTER
I have queried the state Prescription Drug Monitoring Program [PDMP] and found no suspicious PDMP activity. Last fill  05/31/23    Medicine Refill Request    Last Office Visit: 5/11/2023   Last Consult Visit: Visit date not found  Last Telemedicine Visit: 2/23/2021 Rc Whitten DO    Next Appointment: Visit date not found      Current Outpatient Medications:   •  amphetamine-dextroamphetamine XR (ADDERALL XR) 10 mg 24 hr capsule, Take 1 capsule (10 mg total) by mouth every morning., Disp: 30 capsule, Rfl: 0  •  lancets (onetouch ultrasoft) misc, 1 lancet 2 (two) times a day., Disp: 100 each, Rfl: 3  •  ONETOUCH VERIO REFLECT METER misc, 2 (two) times a day. for testing, Disp: , Rfl:   •  ONETOUCH VERIO TEST STRIPS strip, TEST TWICE A DAY, Disp: 50 strip, Rfl: 7  •  semaglutide (OZEMPIC) subcutaneous injection, Inject 1 mg under the skin every (seven) 7 days., Disp: 3 mL, Rfl: 1  •  valACYclovir (VALTREX) 1 gram tablet, 2 tabs po BID x 2 doses prn cold sores, Disp: 30 tablet, Rfl: 0      BP Readings from Last 3 Encounters:   05/11/23 126/74   05/01/23 (!) 163/107   02/08/23 (!) 144/92       Recent Lab results:  Lab Results   Component Value Date    CHOL 197 09/14/2022   ,   Lab Results   Component Value Date    HDL 52 09/14/2022   ,   Lab Results   Component Value Date    LDLCALC 121 (H) 09/14/2022   ,   Lab Results   Component Value Date    TRIG 137 09/14/2022        Lab Results   Component Value Date    GLUCOSE 130 (H) 01/12/2023   ,   Lab Results   Component Value Date    HGBA1C 5.0 06/01/2023         Lab Results   Component Value Date    CREATININE 0.72 01/12/2023       Lab Results   Component Value Date    TSH 1.470 12/31/2019           Lab Results   Component Value Date    HGBA1C 5.0 06/01/2023

## 2023-08-10 ENCOUNTER — TRANSCRIBE ORDERS (OUTPATIENT)
Dept: SCHEDULING | Age: 53
End: 2023-08-10

## 2023-08-10 DIAGNOSIS — L02.214 CUTANEOUS ABSCESS OF GROIN: Primary | ICD-10-CM

## 2023-08-21 ENCOUNTER — HOSPITAL ENCOUNTER (OUTPATIENT)
Dept: RADIOLOGY | Age: 53
Discharge: HOME | End: 2023-08-21
Attending: STUDENT IN AN ORGANIZED HEALTH CARE EDUCATION/TRAINING PROGRAM
Payer: COMMERCIAL

## 2023-08-21 DIAGNOSIS — L02.214 CUTANEOUS ABSCESS OF GROIN: ICD-10-CM

## 2023-08-21 PROCEDURE — 76857 US EXAM PELVIC LIMITED: CPT

## 2023-08-21 NOTE — TELEPHONE ENCOUNTER
Medicine Refill Request    Last Office Visit: 5/11/2023   Last Consult Visit: Visit date not found  Last Telemedicine Visit: 2/23/2021 Rc Whitten, DO    Next Appointment: Visit date not found      Current Outpatient Medications:   •  amphetamine-dextroamphetamine XR (ADDERALL XR) 10 mg 24 hr capsule, Take 1 capsule (10 mg total) by mouth every morning., Disp: 30 capsule, Rfl: 0  •  semaglutide (OZEMPIC) 1 mg/dose (4 mg/3 mL) subcutaneous injection, Inject 1 mg under the skin every (seven) 7 days., Disp: 3 mL, Rfl: 1  •  lancets (onetouch ultrasoft) misc, 1 lancet 2 (two) times a day., Disp: 100 each, Rfl: 3  •  ONETOUCH VERIO REFLECT METER misc, 2 (two) times a day. for testing, Disp: , Rfl:   •  ONETOUCH VERIO TEST STRIPS strip, TEST TWICE A DAY, Disp: 50 strip, Rfl: 7  •  valACYclovir (VALTREX) 1 gram tablet, 2 tabs po BID x 2 doses prn cold sores, Disp: 30 tablet, Rfl: 0      BP Readings from Last 3 Encounters:   05/11/23 126/74   05/01/23 (!) 163/107   02/08/23 (!) 144/92       Recent Lab results:  Lab Results   Component Value Date    CHOL 197 09/14/2022   ,   Lab Results   Component Value Date    HDL 52 09/14/2022   ,   Lab Results   Component Value Date    LDLCALC 121 (H) 09/14/2022   ,   Lab Results   Component Value Date    TRIG 137 09/14/2022        Lab Results   Component Value Date    GLUCOSE 130 (H) 01/12/2023   ,   Lab Results   Component Value Date    HGBA1C 5.0 06/01/2023         Lab Results   Component Value Date    CREATININE 0.72 01/12/2023       Lab Results   Component Value Date    TSH 1.470 12/31/2019           Lab Results   Component Value Date    HGBA1C 5.0 06/01/2023

## 2023-09-26 RX ORDER — DEXTROAMPHETAMINE SACCHARATE, AMPHETAMINE ASPARTATE MONOHYDRATE, DEXTROAMPHETAMINE SULFATE AND AMPHETAMINE SULFATE 2.5; 2.5; 2.5; 2.5 MG/1; MG/1; MG/1; MG/1
10 CAPSULE, EXTENDED RELEASE ORAL EVERY MORNING
Qty: 30 CAPSULE | Refills: 0 | Status: SHIPPED | OUTPATIENT
Start: 2023-09-26 | End: 2023-11-06 | Stop reason: ENTERED-IN-ERROR

## 2023-09-26 NOTE — TELEPHONE ENCOUNTER
Medicine Refill Request    Last Office Visit: 5/11/2023   Last Consult Visit: Visit date not found  Last Telemedicine Visit: 2/23/2021 Rc Whitten,     Next Appointment: Visit date not found      Current Outpatient Medications:     amphetamine-dextroamphetamine XR (ADDERALL XR) 10 mg 24 hr capsule, Take 1 capsule (10 mg total) by mouth every morning., Disp: 30 capsule, Rfl: 0    semaglutide (OZEMPIC) 1 mg/dose (4 mg/3 mL) subcutaneous injection, Inject 1 mg under the skin every (seven) 7 days., Disp: 3 mL, Rfl: 1    lancets (onetouch ultrasoft) misc, 1 lancet 2 (two) times a day., Disp: 100 each, Rfl: 3    ONETOUCH VERIO REFLECT METER misc, 2 (two) times a day. for testing, Disp: , Rfl:     ONETOUCH VERIO TEST STRIPS strip, TEST TWICE A DAY, Disp: 50 strip, Rfl: 7    valACYclovir (VALTREX) 1 gram tablet, 2 tabs po BID x 2 doses prn cold sores, Disp: 30 tablet, Rfl: 0      BP Readings from Last 3 Encounters:   05/11/23 126/74   05/01/23 (!) 163/107   02/08/23 (!) 144/92       Recent Lab results:  Lab Results   Component Value Date    CHOL 197 09/14/2022   ,   Lab Results   Component Value Date    HDL 52 09/14/2022   ,   Lab Results   Component Value Date    LDLCALC 121 (H) 09/14/2022   ,   Lab Results   Component Value Date    TRIG 137 09/14/2022        Lab Results   Component Value Date    GLUCOSE 130 (H) 01/12/2023   ,   Lab Results   Component Value Date    HGBA1C 5.0 06/01/2023         Lab Results   Component Value Date    CREATININE 0.72 01/12/2023       Lab Results   Component Value Date    TSH 1.470 12/31/2019           Lab Results   Component Value Date    HGBA1C 5.0 06/01/2023

## 2023-10-05 ENCOUNTER — TELEPHONE (OUTPATIENT)
Dept: PRIMARY CARE | Facility: CLINIC | Age: 53
End: 2023-10-05

## 2023-10-05 ENCOUNTER — TELEPHONE (OUTPATIENT)
Dept: PRIMARY CARE | Facility: CLINIC | Age: 53
End: 2023-10-05
Payer: COMMERCIAL

## 2023-10-05 DIAGNOSIS — E11.9 DIABETES MELLITUS TYPE 2 IN NONOBESE (CMS/HCC): Primary | ICD-10-CM

## 2023-10-05 NOTE — TELEPHONE ENCOUNTER
Spoke with pt, Ozempic on back order. Due for dose next week. Advised pt that I could speak with PCP for alternative. Pt will wait until beginning of next incase pharmacy gets order in. Will call back if needed.

## 2023-10-05 NOTE — TELEPHONE ENCOUNTER
Patient called in because her ozempic is on back order and the pharmacy does not have a date to when it will be available. She would like to know what she should do. Please give her a call.

## 2023-10-09 ENCOUNTER — TRANSCRIBE ORDERS (OUTPATIENT)
Dept: SCHEDULING | Age: 53
End: 2023-10-09

## 2023-10-09 ENCOUNTER — HOSPITAL ENCOUNTER (OUTPATIENT)
Dept: RADIOLOGY | Age: 53
Discharge: HOME | End: 2023-10-09
Attending: OBSTETRICS & GYNECOLOGY
Payer: COMMERCIAL

## 2023-10-09 DIAGNOSIS — N95.0 POSTMENOPAUSAL BLEEDING: ICD-10-CM

## 2023-10-09 DIAGNOSIS — N95.0 POSTMENOPAUSAL BLEEDING: Primary | ICD-10-CM

## 2023-10-09 PROCEDURE — 76830 TRANSVAGINAL US NON-OB: CPT

## 2023-10-12 LAB — HBA1C MFR BLD: 5 % (ref 4.8–5.6)

## 2023-10-16 NOTE — TELEPHONE ENCOUNTER
Medicine Refill Request    Last Office Visit: 5/11/2023   Last Consult Visit: Visit date not found  Last Telemedicine Visit: 2/23/2021 Rc Whitten,     Next Appointment: 11/13/2023      Current Outpatient Medications:     amphetamine-dextroamphetamine XR (ADDERALL XR) 10 mg 24 hr capsule, Take 1 capsule (10 mg total) by mouth every morning., Disp: 30 capsule, Rfl: 0    lancets (onetouch ultrasoft) misc, 1 lancet 2 (two) times a day., Disp: 100 each, Rfl: 3    ONETOUCH VERIO REFLECT METER misc, 2 (two) times a day. for testing, Disp: , Rfl:     ONETOUCH VERIO TEST STRIPS strip, TEST TWICE A DAY, Disp: 50 strip, Rfl: 7    semaglutide (OZEMPIC) 1 mg/dose (4 mg/3 mL) subcutaneous injection, Inject 1 mg under the skin every (seven) 7 days., Disp: 3 mL, Rfl: 1    valACYclovir (VALTREX) 1 gram tablet, 2 tabs po BID x 2 doses prn cold sores, Disp: 30 tablet, Rfl: 0      BP Readings from Last 3 Encounters:   05/11/23 126/74   05/01/23 (!) 163/107   02/08/23 (!) 144/92       Recent Lab results:  Lab Results   Component Value Date    CHOL 197 09/14/2022   ,   Lab Results   Component Value Date    HDL 52 09/14/2022   ,   Lab Results   Component Value Date    LDLCALC 121 (H) 09/14/2022   ,   Lab Results   Component Value Date    TRIG 137 09/14/2022        Lab Results   Component Value Date    GLUCOSE 130 (H) 01/12/2023   ,   Lab Results   Component Value Date    HGBA1C 5.0 10/11/2023         Lab Results   Component Value Date    CREATININE 0.72 01/12/2023       Lab Results   Component Value Date    TSH 1.470 12/31/2019           Lab Results   Component Value Date    HGBA1C 5.0 10/11/2023

## 2023-11-06 ENCOUNTER — APPOINTMENT (OUTPATIENT)
Dept: PREADMISSION TESTING | Facility: HOSPITAL | Age: 53
End: 2023-11-06
Payer: COMMERCIAL

## 2023-11-06 VITALS — BODY MASS INDEX: 26.63 KG/M2 | WEIGHT: 156 LBS | HEIGHT: 64 IN

## 2023-11-06 RX ORDER — DEXTROAMPHETAMINE SACCHARATE, AMPHETAMINE ASPARTATE, DEXTROAMPHETAMINE SULFATE AND AMPHETAMINE SULFATE 2.5; 2.5; 2.5; 2.5 MG/1; MG/1; MG/1; MG/1
10 TABLET ORAL 2 TIMES DAILY
COMMUNITY
End: 2023-11-20

## 2023-11-06 RX ORDER — DIPHENHYDRAMINE HCL 25 MG
25 CAPSULE ORAL NIGHTLY PRN
COMMUNITY
End: 2024-10-01

## 2023-11-06 NOTE — PRE-PROCEDURE INSTRUCTIONS
1. We will call you between 2 pm and 4 pm on November 27,  to inform you of arrival time for your procedure. If you do not hear by 5PM, please call [phone] for arrival time.     2. Please arrive through the Main Entr[facility] of the [facility] (across from the parking garage) and report to the Surgery Registration Desk on the day of your procedure.    3. Please follow the following fasting guidelines:     No solid food EIGHT HOURS prior to arrival time on day of surgery.  Unlimited clear liquids, meaning water or PLAIN black coffee WITHOUT any milk, cream, sugar, or sweetener are permitted up to TWO HOURS prior to arrival at the hospital.    4. Please take ONLY the following medications with a sip of water on the morning of your procedure: (populate names and/or NONE)    NONE    Stop all over the counter supplements, vitamins and Non-steriodal medications one week before surgery    Stop Trulicity 7 days before surgery last dose should be before 11/20/23    Other Instructions: Do not take any mints, gum, hard candy or lozenges the morning of your procedure      5. Other Instructions: You may brush your teeth the morning of the procedure. Rinse and spit, do not swallow.  Bring a list of your medications with dosages.  Use surgical wash as directed.     6. If you develop a cold, cough, fever, rash, or other symptom prior to the day of the procedure, please report it to your physician immediately.    7. If you need to cancel the procedure for any reason, please contact your physician.    8. Make arrangements to have safe transportation home accompanied by a responsible adult. If you have not arranged safe transportation home, your surgery will be cancelled. Safe transportation may include private vehicle, ride-share service, taxi and public transportation when accompanied by a responsible adult who will assist you home. A responsible adult is someone known to you and does not include the taxi, ride-share or public transit  drive transporting you.    9.  If it is medically necessary for you to have a longer stay, you will be informed as soon as the decision is made.    10. Only bring essential items to the hospital.  Do not wear or bring anything of value to the hospital including jewelry of any kind, money, or wallet. Do not wear make-up or contact lenses.  DO NOT BRING MEDICATIONS FROM HOME unless instructed to do so. DO bring your hearing aids, glasses, and a case    11. No lotion, creams, powders, or oils on skin the morning of procedure     12. Dress in comfortable clothes.    13.  If instructed, please bring a copy of your Advanced Directive (Living Will/Durable Power of ) on the day of your procedure.     14. Ensuring your safety at all times is a very important part of our [facility] Culture of Safety. After having surgery and sedation, you are at risk for falling and balance issues. Although you may feel awake, the effects of the medication can last up to 24 hours after anesthesia. If you need to use the bathroom during your recovery period, nursing staff will escort you there and stay with you to ensure your safety.    15. Refrain from drinking alcohol and smoking cigarettes for 24 hours prior to surgery.    16. Shower with antibacterial soap (Dial) the night before and morning of your procedure.  If your procedure indicates the need for CHG antiseptic wash (Bactoshield or Hibiclens), please use this instead and follow instructions as discussed at the time of your Pre-Admission Testing phone interview or visit.    Above instructions reviewed with patient and patient acknowledges understanding.    Form explained by: Delma Mayes RN

## 2023-11-07 ENCOUNTER — APPOINTMENT (OUTPATIENT)
Dept: LAB | Facility: HOSPITAL | Age: 53
End: 2023-11-07
Attending: OBSTETRICS & GYNECOLOGY
Payer: COMMERCIAL

## 2023-11-07 ENCOUNTER — HOSPITAL ENCOUNTER (OUTPATIENT)
Dept: CARDIOLOGY | Facility: HOSPITAL | Age: 53
Discharge: HOME | End: 2023-11-07
Attending: OBSTETRICS & GYNECOLOGY
Payer: COMMERCIAL

## 2023-11-07 ENCOUNTER — TRANSCRIBE ORDERS (OUTPATIENT)
Dept: REGISTRATION | Facility: HOSPITAL | Age: 53
End: 2023-11-07

## 2023-11-07 DIAGNOSIS — N95.0 POSTMENOPAUSAL BLEEDING: Primary | ICD-10-CM

## 2023-11-07 DIAGNOSIS — N95.0 POSTMENOPAUSAL BLEEDING: ICD-10-CM

## 2023-11-07 LAB
ABO + RH BLD: NORMAL
ALBUMIN SERPL-MCNC: 4.5 G/DL (ref 3.5–5.7)
ALP SERPL-CCNC: 62 IU/L (ref 34–125)
ALT SERPL-CCNC: 41 IU/L (ref 7–52)
ANION GAP SERPL CALC-SCNC: 6 MEQ/L (ref 3–15)
AST SERPL-CCNC: 29 IU/L (ref 13–39)
BASOPHILS # BLD: 0.11 K/UL (ref 0.01–0.1)
BASOPHILS NFR BLD: 1.6 %
BILIRUB SERPL-MCNC: 0.8 MG/DL (ref 0.3–1.2)
BLD GP AB SCN SERPL QL: NEGATIVE
BLOOD BANK CMNT PATIENT-IMP: NORMAL
BUN SERPL-MCNC: 9 MG/DL (ref 7–25)
CALCIUM SERPL-MCNC: 9 MG/DL (ref 8.6–10.3)
CHLORIDE SERPL-SCNC: 104 MEQ/L (ref 98–107)
CO2 SERPL-SCNC: 30 MEQ/L (ref 21–31)
CREAT SERPL-MCNC: 0.7 MG/DL (ref 0.6–1.2)
D AG BLD QL: NEGATIVE
DIFFERENTIAL METHOD BLD: ABNORMAL
EOSINOPHIL # BLD: 0.21 K/UL (ref 0.04–0.36)
EOSINOPHIL NFR BLD: 3 %
ERYTHROCYTE [DISTWIDTH] IN BLOOD BY AUTOMATED COUNT: 12 % (ref 11.7–14.4)
GFR SERPL CREATININE-BSD FRML MDRD: >60 ML/MIN/1.73M*2
GLUCOSE SERPL-MCNC: 94 MG/DL (ref 70–99)
HCT VFR BLDCO AUTO: 44.2 % (ref 35–45)
HGB BLD-MCNC: 14.9 G/DL (ref 11.8–15.7)
IMM GRANULOCYTES # BLD AUTO: 0.03 K/UL (ref 0–0.08)
IMM GRANULOCYTES NFR BLD AUTO: 0.4 %
LABORATORY COMMENT REPORT: NORMAL
LYMPHOCYTES # BLD: 2.68 K/UL (ref 1.2–3.5)
LYMPHOCYTES NFR BLD: 38.6 %
MCH RBC QN AUTO: 30.2 PG (ref 28–33.2)
MCHC RBC AUTO-ENTMCNC: 33.7 G/DL (ref 32.2–35.5)
MCV RBC AUTO: 89.7 FL (ref 83–98)
MONOCYTES # BLD: 0.56 K/UL (ref 0.28–0.8)
MONOCYTES NFR BLD: 8.1 %
NEUTROPHILS # BLD: 3.36 K/UL (ref 1.7–7)
NEUTS SEG NFR BLD: 48.3 %
NRBC BLD-RTO: 0 %
PDW BLD AUTO: 9 FL (ref 9.4–12.3)
PLATELET # BLD AUTO: 284 K/UL (ref 150–369)
POTASSIUM SERPL-SCNC: 4.5 MEQ/L (ref 3.5–5.1)
PROT SERPL-MCNC: 7.5 G/DL (ref 6–8.2)
RBC # BLD AUTO: 4.93 M/UL (ref 3.93–5.22)
SODIUM SERPL-SCNC: 140 MEQ/L (ref 136–145)
SPECIMEN EXP DATE BLD: NORMAL
WBC # BLD AUTO: 6.95 K/UL (ref 3.8–10.5)

## 2023-11-07 PROCEDURE — 93005 ELECTROCARDIOGRAM TRACING: CPT

## 2023-11-07 PROCEDURE — 36415 COLL VENOUS BLD VENIPUNCTURE: CPT

## 2023-11-07 PROCEDURE — 86850 RBC ANTIBODY SCREEN: CPT

## 2023-11-07 PROCEDURE — 85025 COMPLETE CBC W/AUTO DIFF WBC: CPT

## 2023-11-07 PROCEDURE — 80053 COMPREHEN METABOLIC PANEL: CPT

## 2023-11-08 LAB
ATRIAL RATE: 74
P AXIS: 47
PR INTERVAL: 156
QRS DURATION: 70
QT INTERVAL: 400
QTC CALCULATION(BAZETT): 444
R AXIS: 14
T WAVE AXIS: 43
VENTRICULAR RATE: 74

## 2023-11-08 PROCEDURE — 93010 ELECTROCARDIOGRAM REPORT: CPT | Performed by: STUDENT IN AN ORGANIZED HEALTH CARE EDUCATION/TRAINING PROGRAM

## 2023-11-10 NOTE — PROGRESS NOTES
Subjective      No chief complaint on file.     Patient ID: Elda Curiel is a 52 y.o. female presents today c/o:    HPI pt presents today for suture removal on left 3rd finger.  Went to ER 2023 s/p cutting finger on 2023.  No abx in ER, did not require tetanus vaccine.     The following have been reviewed and updated as appropriate in this visit:   Tobacco  Allergies  Meds  Problems  Med Hx  Surg Hx  Fam Hx       Review of Systems   All other systems reviewed and are negative.    Current Outpatient Medications   Medication Sig Dispense Refill   • valACYclovir (VALTREX) 1 gram tablet 2 tabs po BID x 2 doses prn cold sores 30 tablet 0   • amphetamine-dextroamphetamine XR (ADDERALL XR) 10 mg 24 hr capsule Take 1 capsule (10 mg total) by mouth every morning. 30 capsule 0   • lancets (onetouch ultrasoft) misc 1 lancet 2 (two) times a day. 100 each 3   • ONETOUCH VERIO REFLECT METER misc 2 (two) times a day. for testing     • ONETOUCH VERIO TEST STRIPS strip TEST TWICE A DAY 50 strip 7   • semaglutide 0.25 mg or 0.5 mg(2 mg/1.5 mL) pen injector Inject 0.5 mg under the skin every (seven) 7 days. 1.5 mL 0     No current facility-administered medications for this visit.     Past Medical History:   Diagnosis Date   • HTN (hypertension) 2019   • Hypertension      Family History   Problem Relation Age of Onset   • Breast cancer Biological Mother    • Hodgkin's lymphoma Biological Father      No Known Allergies  Past Surgical History:   Procedure Laterality Date   •  SECTION     • TUBAL LIGATION       Social History     Socioeconomic History   • Marital status:      Spouse name: None   • Number of children: None   • Years of education: None   • Highest education level: None   Tobacco Use   • Smoking status: Never   • Smokeless tobacco: Never   Vaping Use   • Vaping status: Never Used   Substance and Sexual Activity   • Alcohol use: Yes   • Drug use: No   • Sexual activity: Yes             Objective     Vitals:   Vitals:    05/11/23 1533   BP: 126/74   Pulse: 95   Resp: 18   Temp: 36.8 °C (98.2 °F)   SpO2: 97%       Physical Exam  Vitals reviewed.   Constitutional:       General: She is not in acute distress.     Appearance: Normal appearance. She is normal weight.   Cardiovascular:      Rate and Rhythm: Normal rate and regular rhythm.      Heart sounds: No murmur heard.  Pulmonary:      Effort: Pulmonary effort is normal. No respiratory distress.      Breath sounds: Normal breath sounds. No wheezing, rhonchi or rales.   Chest:      Chest wall: No tenderness.   Skin:     Findings: No rash.   Neurological:      Mental Status: She is alert and oriented to person, place, and time.   Psychiatric:         Mood and Affect: Mood normal.         Behavior: Behavior normal.         Thought Content: Thought content normal.         Judgment: Judgment normal.         Labs  Lab Results   Component Value Date    WBC 7.2 09/14/2022    HGB 14.1 09/14/2022    HCT 42.2 09/14/2022     09/14/2022    CHOL 197 09/14/2022    TRIG 137 09/14/2022    HDL 52 09/14/2022    LDLCALC 121 (H) 09/14/2022    ALT 43 (H) 09/14/2022    AST 38 09/14/2022     01/12/2023    K 4.6 01/12/2023    GLUCOSE 130 (H) 01/12/2023     01/12/2023    CREATININE 0.72 01/12/2023    BUN 13 01/12/2023    CO2 24 01/12/2023    TSH 1.470 12/31/2019    HGBA1C 6.1 (H) 01/12/2023    EGFR 102 01/12/2023              Assessment/Plan   Diagnoses and all orders for this visit:    Visit for suture removal  Comments:  running suture removed, 1 regular suture removed.   well approximated, no signs of infection/dehiscence.    Laceration of left middle finger without foreign body without damage to nail, subsequent encounter  Comments:  see above    Cold sore  Comments:  will refill valtrex  Orders:  -     valACYclovir (VALTREX) 1 gram tablet; 2 tabs po BID x 2 doses prn cold sores         Patient verbalizes understanding and agrees with plan of  care today.        SHANKAR Grace  5/11/2023     alert

## 2023-11-15 ENCOUNTER — ANESTHESIA EVENT (OUTPATIENT)
Dept: OPERATING ROOM | Facility: HOSPITAL | Age: 53
Setting detail: HOSPITAL OUTPATIENT SURGERY
End: 2023-11-15
Payer: COMMERCIAL

## 2023-11-28 ENCOUNTER — APPOINTMENT (OUTPATIENT)
Dept: RADIOLOGY | Facility: HOSPITAL | Age: 53
Setting detail: HOSPITAL OUTPATIENT SURGERY
End: 2023-11-28
Attending: OBSTETRICS & GYNECOLOGY
Payer: COMMERCIAL

## 2023-11-28 ENCOUNTER — ANESTHESIA (OUTPATIENT)
Dept: OPERATING ROOM | Facility: HOSPITAL | Age: 53
Setting detail: HOSPITAL OUTPATIENT SURGERY
End: 2023-11-28
Payer: COMMERCIAL

## 2023-11-28 ENCOUNTER — HOSPITAL ENCOUNTER (OUTPATIENT)
Facility: HOSPITAL | Age: 53
Setting detail: HOSPITAL OUTPATIENT SURGERY
Discharge: HOME | End: 2023-11-28
Attending: OBSTETRICS & GYNECOLOGY | Admitting: OBSTETRICS & GYNECOLOGY
Payer: COMMERCIAL

## 2023-11-28 VITALS
HEART RATE: 75 BPM | SYSTOLIC BLOOD PRESSURE: 158 MMHG | WEIGHT: 162 LBS | HEIGHT: 64 IN | BODY MASS INDEX: 27.66 KG/M2 | TEMPERATURE: 98.5 F | RESPIRATION RATE: 18 BRPM | DIASTOLIC BLOOD PRESSURE: 82 MMHG | OXYGEN SATURATION: 100 %

## 2023-11-28 DIAGNOSIS — N95.0 PMB (POSTMENOPAUSAL BLEEDING): ICD-10-CM

## 2023-11-28 LAB
GLUCOSE BLD-MCNC: 142 MG/DL (ref 70–99)
GLUCOSE BLD-MCNC: 156 MG/DL (ref 70–99)
POCT TEST: ABNORMAL
POCT TEST: ABNORMAL

## 2023-11-28 PROCEDURE — 0UB98ZX EXCISION OF UTERUS, VIA NATURAL OR ARTIFICIAL OPENING ENDOSCOPIC, DIAGNOSTIC: ICD-10-PCS | Performed by: OBSTETRICS & GYNECOLOGY

## 2023-11-28 PROCEDURE — 71000002 HC PACU PHASE 2 INITIAL 30MIN: Performed by: OBSTETRICS & GYNECOLOGY

## 2023-11-28 PROCEDURE — 88305 TISSUE EXAM BY PATHOLOGIST: CPT | Performed by: OBSTETRICS & GYNECOLOGY

## 2023-11-28 PROCEDURE — 63600000 HC DRUGS/DETAIL CODE: Mod: JZ | Performed by: OBSTETRICS & GYNECOLOGY

## 2023-11-28 PROCEDURE — 63600000 HC DRUGS/DETAIL CODE: Mod: JZ | Performed by: NURSE ANESTHETIST, CERTIFIED REGISTERED

## 2023-11-28 PROCEDURE — 71000001 HC PACU PHASE 1 INITIAL 30MIN: Performed by: OBSTETRICS & GYNECOLOGY

## 2023-11-28 PROCEDURE — 27200000 HC STERILE SUPPLY: Performed by: OBSTETRICS & GYNECOLOGY

## 2023-11-28 PROCEDURE — 71000011 HC PACU PHASE 1 EA ADDL MIN: Performed by: OBSTETRICS & GYNECOLOGY

## 2023-11-28 PROCEDURE — 25800000 HC PHARMACY IV SOLUTIONS: Performed by: NURSE ANESTHETIST, CERTIFIED REGISTERED

## 2023-11-28 PROCEDURE — 76857 US EXAM PELVIC LIMITED: CPT

## 2023-11-28 PROCEDURE — 37000001 HC ANESTHESIA GENERAL: Performed by: OBSTETRICS & GYNECOLOGY

## 2023-11-28 PROCEDURE — 36000002 HC OR LEVEL 2 INITIAL 30MIN: Performed by: OBSTETRICS & GYNECOLOGY

## 2023-11-28 PROCEDURE — 63600000 HC DRUGS/DETAIL CODE: Mod: JZ | Performed by: ANESTHESIOLOGY

## 2023-11-28 PROCEDURE — 36000012 HC OR LEVEL 2 EA ADDL MIN: Performed by: OBSTETRICS & GYNECOLOGY

## 2023-11-28 RX ORDER — IBUPROFEN 200 MG
16-32 TABLET ORAL AS NEEDED
Status: CANCELLED | OUTPATIENT
Start: 2023-11-28

## 2023-11-28 RX ORDER — DEXTROSE 40 %
15-30 GEL (GRAM) ORAL AS NEEDED
Status: CANCELLED | OUTPATIENT
Start: 2023-11-28

## 2023-11-28 RX ORDER — FENTANYL CITRATE 50 UG/ML
INJECTION, SOLUTION INTRAMUSCULAR; INTRAVENOUS AS NEEDED
Status: DISCONTINUED | OUTPATIENT
Start: 2023-11-28 | End: 2023-11-28 | Stop reason: SURG

## 2023-11-28 RX ORDER — DEXTROSE 50 % IN WATER (D50W) INTRAVENOUS SYRINGE
25 AS NEEDED
Status: CANCELLED | OUTPATIENT
Start: 2023-11-28

## 2023-11-28 RX ORDER — FENTANYL CITRATE 50 UG/ML
50 INJECTION, SOLUTION INTRAMUSCULAR; INTRAVENOUS EVERY 5 MIN PRN
Status: DISCONTINUED | OUTPATIENT
Start: 2023-11-28 | End: 2023-11-28 | Stop reason: HOSPADM

## 2023-11-28 RX ORDER — KETOROLAC TROMETHAMINE 30 MG/ML
30 INJECTION, SOLUTION INTRAMUSCULAR; INTRAVENOUS
Status: COMPLETED | OUTPATIENT
Start: 2023-11-28 | End: 2023-11-28

## 2023-11-28 RX ORDER — ESMOLOL HYDROCHLORIDE 10 MG/ML
INJECTION INTRAVENOUS AS NEEDED
Status: DISCONTINUED | OUTPATIENT
Start: 2023-11-28 | End: 2023-11-28 | Stop reason: SURG

## 2023-11-28 RX ORDER — PROPOFOL 200MG/20ML
SYRINGE (ML) INTRAVENOUS AS NEEDED
Status: DISCONTINUED | OUTPATIENT
Start: 2023-11-28 | End: 2023-11-28 | Stop reason: SURG

## 2023-11-28 RX ORDER — PROPOFOL 10 MG/ML
INJECTION, EMULSION INTRAVENOUS CONTINUOUS PRN
Status: DISCONTINUED | OUTPATIENT
Start: 2023-11-28 | End: 2023-11-28 | Stop reason: SURG

## 2023-11-28 RX ORDER — ONDANSETRON HYDROCHLORIDE 2 MG/ML
INJECTION, SOLUTION INTRAVENOUS AS NEEDED
Status: DISCONTINUED | OUTPATIENT
Start: 2023-11-28 | End: 2023-11-28 | Stop reason: SURG

## 2023-11-28 RX ORDER — SODIUM CHLORIDE 9 MG/ML
INJECTION, SOLUTION INTRAVENOUS CONTINUOUS PRN
Status: DISCONTINUED | OUTPATIENT
Start: 2023-11-28 | End: 2023-11-28 | Stop reason: SURG

## 2023-11-28 RX ORDER — DEXAMETHASONE SODIUM PHOSPHATE 4 MG/ML
INJECTION, SOLUTION INTRA-ARTICULAR; INTRALESIONAL; INTRAMUSCULAR; INTRAVENOUS; SOFT TISSUE AS NEEDED
Status: DISCONTINUED | OUTPATIENT
Start: 2023-11-28 | End: 2023-11-28 | Stop reason: SURG

## 2023-11-28 RX ORDER — ONDANSETRON HYDROCHLORIDE 2 MG/ML
4 INJECTION, SOLUTION INTRAVENOUS
Status: DISCONTINUED | OUTPATIENT
Start: 2023-11-28 | End: 2023-11-28 | Stop reason: HOSPADM

## 2023-11-28 RX ORDER — MIDAZOLAM HYDROCHLORIDE 2 MG/2ML
INJECTION, SOLUTION INTRAMUSCULAR; INTRAVENOUS AS NEEDED
Status: DISCONTINUED | OUTPATIENT
Start: 2023-11-28 | End: 2023-11-28 | Stop reason: SURG

## 2023-11-28 RX ADMIN — SODIUM CHLORIDE: 9 INJECTION, SOLUTION INTRAVENOUS at 08:34

## 2023-11-28 RX ADMIN — KETOROLAC TROMETHAMINE 30 MG: 30 INJECTION, SOLUTION INTRAMUSCULAR at 09:52

## 2023-11-28 RX ADMIN — FENTANYL CITRATE 50 MCG: 50 INJECTION, SOLUTION INTRAMUSCULAR; INTRAVENOUS at 09:42

## 2023-11-28 RX ADMIN — PROPOFOL 75 MCG/KG/MIN: 10 INJECTION, EMULSION INTRAVENOUS at 08:42

## 2023-11-28 RX ADMIN — DEXAMETHASONE SODIUM PHOSPHATE 4 MG: 4 INJECTION, SOLUTION INTRA-ARTICULAR; INTRALESIONAL; INTRAMUSCULAR; INTRAVENOUS; SOFT TISSUE at 08:42

## 2023-11-28 RX ADMIN — FENTANYL CITRATE 50 MCG: 50 INJECTION, SOLUTION INTRAMUSCULAR; INTRAVENOUS at 08:41

## 2023-11-28 RX ADMIN — ESMOLOL HYDROCHLORIDE 20 MG: 10 INJECTION, SOLUTION INTRAVENOUS at 09:18

## 2023-11-28 RX ADMIN — PROPOFOL 50 MG: 10 INJECTION, EMULSION INTRAVENOUS at 08:41

## 2023-11-28 RX ADMIN — ONDANSETRON 4 MG: 2 INJECTION INTRAMUSCULAR; INTRAVENOUS at 08:42

## 2023-11-28 RX ADMIN — FENTANYL CITRATE 50 MCG: 50 INJECTION, SOLUTION INTRAMUSCULAR; INTRAVENOUS at 08:51

## 2023-11-28 RX ADMIN — MIDAZOLAM 2 MG: 1 INJECTION INTRAMUSCULAR; INTRAVENOUS at 08:34

## 2023-11-28 RX ADMIN — ESMOLOL HYDROCHLORIDE 30 MG: 10 INJECTION, SOLUTION INTRAVENOUS at 09:06

## 2023-11-28 NOTE — ANESTHESIA PREPROCEDURE EVALUATION
Relevant Problems   CARDIOVASCULAR   (+) White coat syndrome without diagnosis of hypertension      GASTROINTESTINAL   (+) Fatty liver      RESPIRATORY SYSTEM   (+) ESTER (obstructive sleep apnea)      Other   (+) Diabetes mellitus type 2 in nonobese (CMS/HCC)       Anesthesia ROS/MED HX    Anesthesia History - neg  Pulmonary    Sleep apnea and CPAP Compliant  Cardiovascular   hypertension   ECG reviewed  Hematological - neg  Renal Disease- neg  Endo/Other   Diabetes  Body Habitus: Overweight  ROS/MED HX Comments:    Endo: On trulicity       Past Surgical History:   Procedure Laterality Date     SECTION      TUBAL LIGATION         Physical Exam    Airway   Mallampati: II  Cardiovascular - normal   Rhythm: regular   Rate: normalPulmonary - normal   clear to auscultation        Anesthesia Plan    Plan: general and MAC   2 ASA  Anesthetic plan and risks discussed with: patient

## 2023-11-28 NOTE — ANESTHESIOLOGIST PRE-PROCEDURE ATTESTATION
Pre-Procedure Patient Identification:  I am the Primary Anesthesiologist and have identified the patient on 11/28/23 at 8:06 AM.   I have confirmed the procedure(s) will be performed by the following surgeon/proceduralist Regi Hudson DO.

## 2023-11-28 NOTE — ANESTHESIA POSTPROCEDURE EVALUATION
Patient: Elda Curiel    Procedure Summary     Date: 11/28/23 Room / Location:  OR 2 / [facility] OR    Anesthesia Start: 0834 Anesthesia Stop:     Procedure: Ultrasound Guided Hysteroscopy, D&C Diagnosis:       PMB (postmenopausal bleeding)      (PMB (postmenopausal bleeding) [N95.0])    Surgeons: Regi Hudson DO Responsible Provider: Sherrie Salazar MD    Anesthesia Type: general, MAC ASA Status: 2          Anesthesia Type: general, MAC  PACU Vitals    No data found in the last 10 encounters.           Anesthesia Post Evaluation    Pain management: adequate  Patient location during evaluation: PACU  Patient participation: complete - patient participated  Level of consciousness: awake and alert  Cardiovascular status: acceptable  Airway Patency: adequate  Respiratory status: acceptable  Hydration status: acceptable  Anesthetic complications: no

## 2023-11-28 NOTE — OP NOTE
Ultrasound Guided Hysteroscopy, D&C Procedure Note     Procedure:    Ultrasound Guided Hysteroscopy, D&C  CPT(R) Code:  13155 - FL HYSTEROSCOPY,W/ENDO BX    Indications: The patient has a history of postmenopausal bleeding.  She has not had a menses in over a year and has a hx of endometrial ablation in the past.  During the episode of bleeding she had a pelvic u/s showing a thickened lining and possible polyp vs clot/blood products in the lower uterine segment. The patient now presents for surgery after discussing therapeutic alternatives.          Pre-op Diagnosis     * PMB (postmenopausal bleeding) [N95.0]    Post-op Diagnosis     * PMB (postmenopausal bleeding) [N95.0]    Surgeon: Regi Hudson DO     Assistants: none    Anesthesia: Monitored Local Anesthesia with Sedation          Procedure Details   The patient was taken to the OR where anesthesia was performed.  She was then sterilely prepped and draped in a dorsolithotomy position.  A timeout was performed.  The ultrasound tech was present in the operating room and performing a transabdominal ultrasound throughout the procedure.  The ultrasound prior to instrumentation showed a thin endometrial lining at 2 to 3 mm with no endometrial masses.  A speculum was placed in the posterior aspect of the patient's vagina and the anterior lip of the cervix was grasped with a single-tooth tenaculum.  A uterine dilator was then placed through the external os with scarring noted at the internal os and unable to pass the dilator through the internal os with visualization via transabdominal ultrasound.  A lacrimal duct probe was then used to penetrate through the internal os given that the diameter is much smaller.  This passed through without difficulty and was visualized on ultrasound.  A flexible plastic os finder was then used to slowly penetrate through the internal os.  This was done in a graduated fashion.  Hysteroscope was then introduced through the cervix and  under direct visualization both with ultrasound and through the hysteroscope into the very tight opening of the internal os.  There was noted to be scarring in the endometrium and neither ostia was visualized.  It was difficult to pass the hysteroscope up to the fundal area of the uterus due to the scarring of the endometrium and photographs were obtained.  Due to this, instead of a sharp curetting, an endometrial Pipelle was used due to the smaller diameter, to get an endometrial biopsy.  A scant amount of tissue was obtained and sent for pathology.  All instrumentation was then removed from the patient's vagina and hemostasis was noted at the tenaculum site.      I will await pathology, but I feel that the ultrasound findings from last month during the time when the patient was bleeding most likely were due to blood products in that lower uterine segment having difficulty passing through the internal os creating a bit of a hematometrium.  On the ultrasound today the lining was very thin and no masses noted with scarring within the endometrial cavity.  If pathology is benign I feel no further follow-up will be needed at this time.    Findings:  Stenotic internal os with endometrial scarring, no masses, u/s findings show thin 2-3mm endometrial lining without mass    Estimated Blood Loss:  No blood loss documented.           Drains: none                      Specimens:   ID Type Source Tests Collected by Time Destination   1 : Endometrial Biopsy Tissue Endometrium PATHOLOGY TISSUE EXAM Regi Hudson DO 11/28/2023 0910               Complications:  none           Disposition: PACU - hemodynamically stable.           Condition: stable    Regi Hudson DO  Phone Number: [phone]

## 2023-11-28 NOTE — OR SURGEON
Pre-Procedure patient identification:  I am the primary operating surgeon/proceduralist and I have reviewed the applicable pathology reports and radiology studies for this procedure. I have identified the patient on 11/28/23 at 8:31 AM Regi Hudson DO  Phone Number: [phone]

## 2023-11-28 NOTE — H&P
Gynecology History and Physical    HPI     Patient is a 53 y.o. female who presents with postmenopausal bleeding.  She has a hx of endometrial ablation.  U/S shows thickened endometrium with possible polyp.     OB History:   OB History   No obstetric history on file.       Medical History:   Past Medical History:   Diagnosis Date   • ADD (attention deficit disorder)    • HTN (hypertension) 2019   • Motion sickness    • Sleep apnea    • Type 2 diabetes mellitus (CMS/HCC)        Surgical History:   Past Surgical History:   Procedure Laterality Date   •  SECTION     • TUBAL LIGATION         Social History:   Social History     Social History Narrative   • Not on file       Family History:   Family History   Problem Relation Age of Onset   • Breast cancer Biological Mother    • Hodgkin's lymphoma Biological Father        Allergies: Patient has no known allergies.    Prior to Admission medications    Medication Sig Start Date End Date Taking? Authorizing Provider   lancets (onetouch ultrasoft) misc 1 lancet 2 (two) times a day. 22  Yes Rc Whitten, DO   ONETOUCH VERIO REFLECT METER misc 2 (two) times a day. for testing 22  Yes Provider, Lucy Arellano MD   ONETOUCH VERIO TEST STRIPS strip TEST TWICE A DAY 23  Yes Juan oNgueira MD   diphenhydrAMINE (BENADRYL) 25 mg capsule Take 25 mg by mouth nightly as needed for allergies.    Provider, Lucy Arellano MD   dulaglutide (TRULICITY) 1.5 mg/0.5 mL pen injector Inject 0.5 mL (1.5 mg total) under the skin once a week. Do not prime. 23  Juan Nogueira MD   valACYclovir (VALTREX) 1 gram tablet 2 tabs po BID x 2 doses prn cold sores 23   Juan Nogueira MD       Review of Systems  All other systems reviewed and negative except as noted in the HPI.    Objective     Vital Signs for the last 24 hours:  Temp:  [36.9 °C (98.5 °F)] 36.9 °C (98.5 °F)  Heart Rate:  [75] 75  Resp:  [15] 15  BP: (133)/(85)  133/85    Exam  General Appearance: Alert, cooperative, no acute distress  Head: Normocephalic, without obvious abnormality, atraumatic  Throat: Lips, mucosa, and tongue appear normal  Neck: no adenopathy  Thyroid: no enlargement/tenderness/nodules  Lungs: Clear to auscultation bilaterally, respirations unlabored  Heart: Regular rate and rhythm, S1 and S2 normal, no murmur, rub or gallop  Breast: Deferred  Abdomen: Soft, nontender, nondistended, bowel sounds active all four quadrants,  no masses, no organomegaly  Back: no CVA tenderness  Genitalia: no lesions or masses, no uterine, cervical, or adnexal tenderness, no discharge or vaginal bleeding present  Rectal: Deferred  Extremities: no edema or calf tenderness  Musculoskeletal: No injury or deformity  Skin: Skin color, texture, turgor normal, no rashes or lesions  Lymph nodes: inguinal and axillary nodes normal  Neurologic: Deferred    Labs  No new labs.    Imaging  I have independently reviewed the patient's Imaging. Signifcant abnormals are thickened endometrium with possible polyp        Assessment/Plan   1. Postmenopausal bleeding - she has had 3 prior C/S and an endometrial ablation so will perform H/S D&C with u/s guidance.  Procedure and risks discussed and consent obtained.    Code Status: No Order

## 2023-11-29 LAB
CASE RPRT: NORMAL
CLINICAL INFO: NORMAL
PATH REPORT.FINAL DX SPEC: NORMAL
PATH REPORT.GROSS SPEC: NORMAL

## 2024-01-09 ENCOUNTER — OFFICE VISIT (OUTPATIENT)
Dept: PRIMARY CARE | Facility: CLINIC | Age: 54
End: 2024-01-09
Payer: COMMERCIAL

## 2024-01-09 VITALS
TEMPERATURE: 98.2 F | DIASTOLIC BLOOD PRESSURE: 82 MMHG | SYSTOLIC BLOOD PRESSURE: 144 MMHG | WEIGHT: 164 LBS | HEIGHT: 64 IN | HEART RATE: 88 BPM | OXYGEN SATURATION: 98 % | RESPIRATION RATE: 16 BRPM | BODY MASS INDEX: 28 KG/M2

## 2024-01-09 DIAGNOSIS — F98.8 ATTENTION DEFICIT DISORDER, UNSPECIFIED HYPERACTIVITY PRESENCE: ICD-10-CM

## 2024-01-09 DIAGNOSIS — E11.9 DIABETES MELLITUS TYPE 2 IN NONOBESE (CMS/HCC): Primary | ICD-10-CM

## 2024-01-09 DIAGNOSIS — Z12.11 SCREEN FOR COLON CANCER: ICD-10-CM

## 2024-01-09 PROBLEM — R79.89 ELEVATED LFTS: Status: RESOLVED | Noted: 2020-01-02 | Resolved: 2024-01-09

## 2024-01-09 PROCEDURE — 3008F BODY MASS INDEX DOCD: CPT | Performed by: FAMILY MEDICINE

## 2024-01-09 PROCEDURE — 3077F SYST BP >= 140 MM HG: CPT | Performed by: FAMILY MEDICINE

## 2024-01-09 PROCEDURE — 3079F DIAST BP 80-89 MM HG: CPT | Performed by: FAMILY MEDICINE

## 2024-01-09 PROCEDURE — 99213 OFFICE O/P EST LOW 20 MIN: CPT | Performed by: FAMILY MEDICINE

## 2024-01-09 RX ORDER — DEXTROAMPHETAMINE SACCHARATE, AMPHETAMINE ASPARTATE MONOHYDRATE, DEXTROAMPHETAMINE SULFATE AND AMPHETAMINE SULFATE 2.5; 2.5; 2.5; 2.5 MG/1; MG/1; MG/1; MG/1
10 CAPSULE, EXTENDED RELEASE ORAL EVERY MORNING
Qty: 30 CAPSULE | Refills: 0 | Status: SHIPPED | OUTPATIENT
Start: 2024-01-09 | End: 2024-10-02 | Stop reason: SDUPTHER

## 2024-01-09 RX ORDER — SEMAGLUTIDE 0.68 MG/ML
0.5 INJECTION, SOLUTION SUBCUTANEOUS
COMMUNITY
End: 2024-10-01

## 2024-01-09 ASSESSMENT — ENCOUNTER SYMPTOMS
DIZZINESS: 0
DIARRHEA: 0
WEAKNESS: 0
CONFUSION: 0
PALPITATIONS: 0
HEADACHES: 0
NUMBNESS: 0
ABDOMINAL PAIN: 0
WHEEZING: 0
NAUSEA: 0
STRIDOR: 0
POLYPHAGIA: 0
CHILLS: 0
HEMATURIA: 0
BLOOD IN STOOL: 0
CONSTIPATION: 0
DYSURIA: 0
DYSPHORIC MOOD: 0
FEVER: 0
SORE THROAT: 0
POLYDIPSIA: 0
SHORTNESS OF BREATH: 0
COUGH: 0
VOMITING: 0

## 2024-01-09 ASSESSMENT — PATIENT HEALTH QUESTIONNAIRE - PHQ9: SUM OF ALL RESPONSES TO PHQ9 QUESTIONS 1 & 2: 0

## 2024-01-09 NOTE — PROGRESS NOTES
"      Subjective      Patient ID: Elda Curiel is a 53 y.o. female.  1970      ck      The following have been reviewed and updated as appropriate in this visit:   Allergies  Meds  Problems       Review of Systems   Constitutional: Negative for chills and fever.   HENT: Negative for dental problem, hearing loss and sore throat.    Eyes: Negative for visual disturbance.   Respiratory: Negative for cough, shortness of breath, wheezing and stridor.    Cardiovascular: Negative for chest pain, palpitations and leg swelling.   Gastrointestinal: Negative for abdominal pain, blood in stool, constipation, diarrhea, nausea and vomiting.   Endocrine: Negative for cold intolerance, heat intolerance, polydipsia, polyphagia and polyuria.   Genitourinary: Negative for dysuria, hematuria, vaginal bleeding and vaginal discharge.   Musculoskeletal: Negative for gait problem.   Skin: Negative for rash.   Neurological: Negative for dizziness, syncope, weakness, numbness and headaches.   Psychiatric/Behavioral: Negative for confusion and dysphoric mood.       Objective     Vitals:    01/09/24 1441   BP: (!) 144/82   BP Location: Left upper arm   Patient Position: Sitting   Pulse: 88   Resp: 16   Temp: 36.8 °C (98.2 °F)   TempSrc: Temporal   SpO2: 98%   Weight: 74.4 kg (164 lb)   Height: 1.626 m (5' 4\")     Body mass index is 28.15 kg/m².    Physical Exam  Vitals and nursing note reviewed.   Constitutional:       Appearance: Normal appearance.   Eyes:      General: No scleral icterus.  Cardiovascular:      Rate and Rhythm: Normal rate and regular rhythm.      Heart sounds: Normal heart sounds.   Pulmonary:      Effort: Pulmonary effort is normal.      Breath sounds: Normal breath sounds.   Musculoskeletal:      Right lower leg: No edema.      Left lower leg: No edema.   Skin:     General: Skin is warm and dry.      Findings: No rash.   Neurological:      General: No focal deficit present.      Mental Status: She is " alert.   Psychiatric:         Mood and Affect: Mood normal.         Behavior: Behavior normal.         Assessment/Plan   Diagnoses and all orders for this visit:    Diabetes mellitus type 2 in nonobese (CMS/HCC) (Primary)  Comments:  lab  sees Ophthal  Orders:  -     Microalbumin / creatinine urine ratio; Future  -     Hemoglobin A1c; Future  -     TSH; Future  -     Lipid panel; Future    Screen for colon cancer  Comments:  colonosc    Attention deficit disorder, unspecified hyperactivity presence    Other orders  -     semaglutide (OZEMPIC) 2 mg/dose (8 mg/3 mL) subcutaneous injection; Inject 2 mg under the skin every (seven) 7 days.  -     amphetamine-dextroamphetamine XR (ADDERALL XR) 10 mg 24 hr capsule; Take 1 capsule (10 mg total) by mouth every morning.

## 2024-02-18 ENCOUNTER — HOSPITAL ENCOUNTER (OUTPATIENT)
Facility: CLINIC | Age: 54
Discharge: HOME | End: 2024-02-18
Attending: HOSPITALIST
Payer: COMMERCIAL

## 2024-02-18 ENCOUNTER — APPOINTMENT (OUTPATIENT)
Dept: RADIOLOGY | Age: 54
End: 2024-02-18
Attending: HOSPITALIST
Payer: COMMERCIAL

## 2024-02-18 VITALS
BODY MASS INDEX: 27.66 KG/M2 | SYSTOLIC BLOOD PRESSURE: 142 MMHG | HEART RATE: 84 BPM | OXYGEN SATURATION: 98 % | DIASTOLIC BLOOD PRESSURE: 95 MMHG | TEMPERATURE: 97.9 F | RESPIRATION RATE: 16 BRPM | WEIGHT: 162 LBS | HEIGHT: 64 IN

## 2024-02-18 DIAGNOSIS — J01.10 ACUTE NON-RECURRENT FRONTAL SINUSITIS: Primary | ICD-10-CM

## 2024-02-18 PROCEDURE — 71101 X-RAY EXAM UNILAT RIBS/CHEST: CPT | Mod: RT | Performed by: HOSPITALIST

## 2024-02-18 PROCEDURE — 99214 OFFICE O/P EST MOD 30 MIN: CPT | Performed by: HOSPITALIST

## 2024-02-18 RX ORDER — AZITHROMYCIN 250 MG/1
250 TABLET, FILM COATED ORAL DAILY
Qty: 6 TABLET | Refills: 0 | Status: SHIPPED | OUTPATIENT
Start: 2024-02-18 | End: 2024-02-18

## 2024-02-18 RX ORDER — AZITHROMYCIN 250 MG/1
250 TABLET, FILM COATED ORAL DAILY
Qty: 6 TABLET | Refills: 0 | Status: SHIPPED
Start: 2024-02-18 | End: 2024-02-23

## 2024-02-18 RX ORDER — CODEINE PHOSPHATE AND GUAIFENESIN 10; 100 MG/5ML; MG/5ML
10 SOLUTION ORAL EVERY 6 HOURS PRN
Qty: 237 ML | Refills: 0 | Status: SHIPPED | OUTPATIENT
Start: 2024-02-18 | End: 2024-02-28

## 2024-02-18 NOTE — DISCHARGE INSTRUCTIONS
Susi as directed  Robitussin with codeine 10 mL every 6 hours as needed for cough avoid use of machinery while on this medication  Warm moist heat  Follow-up with Dr. Nogueira if no better in 1 week

## 2024-02-18 NOTE — ED PROVIDER NOTES
"History  Chief Complaint   Patient presents with   • Sinusitis     Nasal congestion, sinus pressure and cough started last .    • Fall     Pt slipped on ice  Wednesday morning---coughing and sleeping bothers her (right side. )      HPI  53-year-old female history of ADD DM HTN.  The past week patient has been having nasal congestion sinus pressure with headache as well as cough productive yellowish in nature.  Patient has been taken over-the-counter cough medications like Delsym and Mucinex with minimal relief.  Moreover 3 days ago patient had slipped and fell on the ice falling on her right side over her \"yeti mug \".  Since then patient has been having pain cough worsens the pain over the right chest wall area.  Past Medical History:   Diagnosis Date   • ADD (attention deficit disorder)    • HTN (hypertension) 2019   • Motion sickness    • Sleep apnea    • Type 2 diabetes mellitus (CMS/HCC)        Past Surgical History:   Procedure Laterality Date   •  SECTION     • TUBAL LIGATION         Family History   Problem Relation Age of Onset   • Breast cancer Biological Mother    • Hodgkin's lymphoma Biological Father        Social History     Tobacco Use   • Smoking status: Never   • Smokeless tobacco: Never   Vaping Use   • Vaping Use: Never used   Substance Use Topics   • Alcohol use: Yes     Comment: occasional   • Drug use: No       Review of Systems  Constitutional well developed well nourished no fever no chills  HEENT: No visual changes no hearing changes no throat pain no jaw pain nasal congestion sneezing  Cardiovascular: No chest pain no palpitation  Respiratory: Cough productive right chest wall pain no shortness of breath no diaphoresis no PND  Gastrointestinal: No abdominal pain no constipation no diarrhea no nausea vomiting no weight loss  Genitourinary: No urinary frequency no dysuria no hematuria  Musculoskeletal: No neck pain back pain   Neuro: No dizziness no headache no " ataxia  Physical Exam  ED Triage Vitals [02/18/24 0941]   Temp Heart Rate Resp BP SpO2   36.6 °C (97.9 °F) 84 16 (!) 142/95 98 %      Temp Source Heart Rate Source Patient Position BP Location FiO2 (%) (Set)   Temporal Monitor Sitting Right upper arm --       Physical Exam    HEENT PE RRL, no nystagmus, nasal bogginess clear TMs no icteric sclerae, good EOM  Heart RRR S1-S2 no murmurs  Lungs CTA no wheezing no rales no retractions no rhonchi poor inspiratory expiratory effort due to the right chest wall tenderness  Extremities good pulses, no edema, good range of motion,      Procedures  Procedures  Chest x-ray negative for fracture, effusion or infiltrate  UC Course     Sinusitis  Chest wall contusion    MDM    Discharged home good and stable  Z-Kirk as directed  Robitussin with codeine 10 mL every 6 hours as needed for cough avoid use of machinery while on this medication  Warm moist heat  Follow-up with Dr. Nogueira if no better in 1 week             Bernardo Botello MD  02/18/24 9163

## 2024-04-02 LAB
ALBUMIN/CREAT UR: 7 MG/G CREAT (ref 0–29)
CHOLEST SERPL-MCNC: 196 MG/DL (ref 100–199)
CREAT UR-MCNC: 116.6 MG/DL
HBA1C MFR BLD: 5 % (ref 4.8–5.6)
HDLC SERPL-MCNC: 52 MG/DL
LDLC SERPL CALC-MCNC: 120 MG/DL (ref 0–99)
MICROALBUMIN UR-MCNC: 8.1 UG/ML
TRIGL SERPL-MCNC: 138 MG/DL (ref 0–149)
TSH SERPL DL<=0.005 MIU/L-ACNC: 1.09 UIU/ML (ref 0.45–4.5)
VLDLC SERPL CALC-MCNC: 24 MG/DL (ref 5–40)

## 2024-04-22 ENCOUNTER — TELEPHONE (OUTPATIENT)
Dept: PRIMARY CARE | Facility: CLINIC | Age: 54
End: 2024-04-22
Payer: COMMERCIAL

## 2024-04-22 ENCOUNTER — HOSPITAL ENCOUNTER (OUTPATIENT)
Dept: RADIOLOGY | Age: 54
Discharge: HOME | End: 2024-04-22
Attending: FAMILY MEDICINE
Payer: COMMERCIAL

## 2024-04-22 DIAGNOSIS — Z12.31 ENCOUNTER FOR SCREENING MAMMOGRAM FOR MALIGNANT NEOPLASM OF BREAST: Primary | ICD-10-CM

## 2024-04-22 DIAGNOSIS — Z12.31 ENCOUNTER FOR SCREENING MAMMOGRAM FOR MALIGNANT NEOPLASM OF BREAST: ICD-10-CM

## 2024-04-22 PROCEDURE — 77067 SCR MAMMO BI INCL CAD: CPT

## 2024-04-22 NOTE — TELEPHONE ENCOUNTER
Patient called to follow up on script for Mammogram patient has appointment scheduled for 4/22/2024 at 3:45 PM and needs to have script before visit please send to patients my chart and over to location please notify once completed.

## 2024-08-01 RX ORDER — SEMAGLUTIDE 2.68 MG/ML
INJECTION, SOLUTION SUBCUTANEOUS
Qty: 3 ML | Refills: 1 | Status: SHIPPED | OUTPATIENT
Start: 2024-08-01 | End: 2024-10-02

## 2024-08-01 NOTE — TELEPHONE ENCOUNTER
Medicine Refill Request    Last Office Visit: 1/9/2024   Last Consult Visit: Visit date not found  Last Telemedicine Visit: 2/23/2021 Rc Whitten,     Next Appointment: Visit date not found      Current Outpatient Medications:     amphetamine-dextroamphetamine XR (ADDERALL XR) 10 mg 24 hr capsule, Take 1 capsule (10 mg total) by mouth every morning., Disp: 30 capsule, Rfl: 0    diphenhydrAMINE (BENADRYL) 25 mg capsule, Take 25 mg by mouth nightly as needed for allergies., Disp: , Rfl:     lancets (onetouch ultrasoft) misc, 1 lancet 2 (two) times a day., Disp: 100 each, Rfl: 3    ONETOUCH VERIO REFLECT METER misc, 2 (two) times a day. for testing, Disp: , Rfl:     ONETOUCH VERIO TEST STRIPS strip, TEST TWICE A DAY, Disp: 50 strip, Rfl: 7    semaglutide (OZEMPIC) 0.25 mg or 0.5 mg (2 mg/3 mL) subcutaneous injection, Inject 0.5 mg under the skin every (seven) 7 days., Disp: , Rfl:     semaglutide (OZEMPIC) 2 mg/dose (8 mg/3 mL) subcutaneous injection, Inject 2 mg under the skin every (seven) 7 days., Disp: 3 mL, Rfl: 5    valACYclovir (VALTREX) 1 gram tablet, 2 tabs po BID x 2 doses prn cold sores, Disp: 30 tablet, Rfl: 0      BP Readings from Last 3 Encounters:   02/18/24 (!) 142/95   01/09/24 (!) 144/82   11/28/23 (!) 158/82       Recent Lab results:  Lab Results   Component Value Date    CHOL 196 04/01/2024   ,   Lab Results   Component Value Date    HDL 52 04/01/2024   ,   Lab Results   Component Value Date    LDLCALC 120 (H) 04/01/2024   ,   Lab Results   Component Value Date    TRIG 138 04/01/2024        Lab Results   Component Value Date    GLUCOSE 94 11/07/2023   ,   Lab Results   Component Value Date    HGBA1C 5.0 04/01/2024         Lab Results   Component Value Date    CREATININE 0.7 11/07/2023       Lab Results   Component Value Date    TSH 1.090 04/01/2024           Lab Results   Component Value Date    HGBA1C 5.0 04/01/2024       Lab Results   Component Value Date    EGFR >60.0 11/07/2023    EGFR  102 01/12/2023    EGFR 106 09/14/2022

## 2024-10-02 ENCOUNTER — OFFICE VISIT (OUTPATIENT)
Dept: PRIMARY CARE | Facility: CLINIC | Age: 54
End: 2024-10-02
Payer: COMMERCIAL

## 2024-10-02 VITALS
TEMPERATURE: 98.2 F | BODY MASS INDEX: 26.63 KG/M2 | WEIGHT: 156 LBS | HEIGHT: 64 IN | OXYGEN SATURATION: 99 % | SYSTOLIC BLOOD PRESSURE: 135 MMHG | DIASTOLIC BLOOD PRESSURE: 85 MMHG | RESPIRATION RATE: 16 BRPM | HEART RATE: 84 BPM

## 2024-10-02 DIAGNOSIS — B00.1 COLD SORE: ICD-10-CM

## 2024-10-02 DIAGNOSIS — Z12.11 SCREEN FOR COLON CANCER: ICD-10-CM

## 2024-10-02 DIAGNOSIS — E11.9 DIABETES MELLITUS TYPE 2 IN NONOBESE (CMS/HCC): Primary | ICD-10-CM

## 2024-10-02 DIAGNOSIS — R03.0 WHITE COAT SYNDROME WITHOUT DIAGNOSIS OF HYPERTENSION: ICD-10-CM

## 2024-10-02 PROCEDURE — 99214 OFFICE O/P EST MOD 30 MIN: CPT | Mod: 25 | Performed by: FAMILY MEDICINE

## 2024-10-02 PROCEDURE — 3008F BODY MASS INDEX DOCD: CPT | Performed by: FAMILY MEDICINE

## 2024-10-02 PROCEDURE — 90656 IIV3 VACC NO PRSV 0.5 ML IM: CPT | Performed by: FAMILY MEDICINE

## 2024-10-02 PROCEDURE — 3079F DIAST BP 80-89 MM HG: CPT | Performed by: FAMILY MEDICINE

## 2024-10-02 PROCEDURE — 90471 IMMUNIZATION ADMIN: CPT | Performed by: FAMILY MEDICINE

## 2024-10-02 PROCEDURE — 3075F SYST BP GE 130 - 139MM HG: CPT | Performed by: FAMILY MEDICINE

## 2024-10-02 RX ORDER — ZOLPIDEM TARTRATE 5 MG/1
5 TABLET ORAL NIGHTLY PRN
Qty: 30 TABLET | Refills: 1 | Status: SHIPPED | OUTPATIENT
Start: 2024-10-02 | End: 2025-03-05 | Stop reason: SDUPTHER

## 2024-10-02 RX ORDER — DEXTROAMPHETAMINE SACCHARATE, AMPHETAMINE ASPARTATE MONOHYDRATE, DEXTROAMPHETAMINE SULFATE AND AMPHETAMINE SULFATE 2.5; 2.5; 2.5; 2.5 MG/1; MG/1; MG/1; MG/1
10 CAPSULE, EXTENDED RELEASE ORAL EVERY MORNING
Qty: 30 CAPSULE | Refills: 0 | Status: SHIPPED | OUTPATIENT
Start: 2024-10-02 | End: 2025-03-05 | Stop reason: SDUPTHER

## 2024-10-02 RX ORDER — VALACYCLOVIR HYDROCHLORIDE 1 G/1
TABLET, FILM COATED ORAL
Qty: 30 TABLET | Refills: 0 | Status: SHIPPED | OUTPATIENT
Start: 2024-10-02

## 2024-10-02 ASSESSMENT — ENCOUNTER SYMPTOMS
ABDOMINAL PAIN: 0
BLOOD IN STOOL: 0
NAUSEA: 0
HEMATURIA: 0
DYSPHORIC MOOD: 0
CHILLS: 0
COUGH: 0
POLYDIPSIA: 0
CONFUSION: 0
STRIDOR: 0
WHEEZING: 0
PALPITATIONS: 0
WEAKNESS: 0
DIZZINESS: 0
FEVER: 0
CONSTIPATION: 0
DYSURIA: 0
SORE THROAT: 0
SLEEP DISTURBANCE: 1
SHORTNESS OF BREATH: 0
DIARRHEA: 0
HEADACHES: 0
POLYPHAGIA: 0
VOMITING: 0
NUMBNESS: 0

## 2024-10-02 NOTE — ASSESSMENT & PLAN NOTE
Decr ozempic  Orders:    Comprehensive metabolic panel; Future    Hemoglobin A1c; Future    semaglutide (OZEMPIC) 1 mg/dose (4 mg/3 mL) subcutaneous injection; Inject 1 mg under the skin every (seven) 7 days.

## 2024-10-02 NOTE — PROGRESS NOTES
"    Subjective      Patient ID: Elda Curiel is a 54 y.o. female.  1970      [facility]        The following have been reviewed and updated as appropriate in this visit:   Problems       Review of Systems   Constitutional:  Negative for chills and fever.   HENT:  Negative for dental problem, hearing loss and sore throat.    Eyes:  Negative for visual disturbance.   Respiratory:  Negative for cough, shortness of breath, wheezing and stridor.    Cardiovascular:  Negative for chest pain, palpitations and leg swelling.   Gastrointestinal:  Negative for abdominal pain, blood in stool, constipation, diarrhea, nausea and vomiting.   Endocrine: Negative for cold intolerance, heat intolerance, polydipsia, polyphagia and polyuria.   Genitourinary:  Negative for dysuria, hematuria, vaginal bleeding and vaginal discharge.   Musculoskeletal:  Negative for gait problem.   Skin:  Negative for rash.   Neurological:  Negative for dizziness, syncope, weakness, numbness and headaches.   Psychiatric/Behavioral:  Positive for sleep disturbance. Negative for confusion and dysphoric mood.        Objective     Vitals:    10/02/24 1451 10/02/24 1524   BP: (!) 144/78 135/85   BP Location: Left upper arm    Patient Position: Sitting    Pulse: 84    Resp: 16    Temp: 36.8 °C (98.2 °F)    TempSrc: Temporal    SpO2: 99%    Weight: 70.8 kg (156 lb)    Height: 1.626 m (5' 4\")      Body mass index is 26.78 kg/m².    Physical Exam  Vitals and nursing note reviewed.   Constitutional:       Appearance: Normal appearance.   Eyes:      General: No scleral icterus.  Cardiovascular:      Rate and Rhythm: Normal rate and regular rhythm.      Heart sounds: Normal heart sounds.   Pulmonary:      Effort: Pulmonary effort is normal.      Breath sounds: Normal breath sounds.   Musculoskeletal:      Right lower leg: No edema.      Left lower leg: No edema.   Skin:     General: Skin is warm and dry.      Findings: No rash.   Neurological:      General: " No focal deficit present.      Mental Status: She is alert.   Psychiatric:         Mood and Affect: Mood normal.         Behavior: Behavior normal.         Assessment & Plan  Diabetes mellitus type 2 in nonobese (CMS/HCC)  Decr ozempic  Orders:    Comprehensive metabolic panel; Future    Hemoglobin A1c; Future    semaglutide (OZEMPIC) 1 mg/dose (4 mg/3 mL) subcutaneous injection; Inject 1 mg under the skin every (seven) 7 days.    White coat syndrome without diagnosis of hypertension  Home monitor         Screen for colon cancer  colonosc       Cold sore    Orders:    valACYclovir (VALTREX) 1 gram tablet; 2 tabs po BID x 2 doses prn cold sores

## 2024-11-06 LAB
ALBUMIN SERPL-MCNC: 4.6 G/DL (ref 3.8–4.9)
ALP SERPL-CCNC: 70 IU/L (ref 44–121)
ALT SERPL-CCNC: 19 IU/L (ref 0–32)
AST SERPL-CCNC: 21 IU/L (ref 0–40)
BILIRUB SERPL-MCNC: 0.9 MG/DL (ref 0–1.2)
BUN SERPL-MCNC: 11 MG/DL (ref 6–24)
BUN/CREAT SERPL: 15 (ref 9–23)
CALCIUM SERPL-MCNC: 9.3 MG/DL (ref 8.7–10.2)
CHLORIDE SERPL-SCNC: 101 MMOL/L (ref 96–106)
CO2 SERPL-SCNC: 25 MMOL/L (ref 20–29)
CREAT SERPL-MCNC: 0.72 MG/DL (ref 0.57–1)
EGFRCR SERPLBLD CKD-EPI 2021: 101 ML/MIN/1.73
GLOBULIN SER CALC-MCNC: 2.5 G/DL (ref 1.5–4.5)
GLUCOSE SERPL-MCNC: 87 MG/DL (ref 70–99)
HBA1C MFR BLD: 4.8 % (ref 4.8–5.6)
POTASSIUM SERPL-SCNC: 4.5 MMOL/L (ref 3.5–5.2)
PROT SERPL-MCNC: 7.1 G/DL (ref 6–8.5)
SODIUM SERPL-SCNC: 140 MMOL/L (ref 134–144)

## 2024-12-31 ENCOUNTER — HOSPITAL ENCOUNTER (OUTPATIENT)
Facility: CLINIC | Age: 54
Discharge: HOME | End: 2024-12-31
Attending: FAMILY MEDICINE
Payer: COMMERCIAL

## 2024-12-31 VITALS
TEMPERATURE: 97.9 F | OXYGEN SATURATION: 97 % | SYSTOLIC BLOOD PRESSURE: 142 MMHG | DIASTOLIC BLOOD PRESSURE: 94 MMHG | HEART RATE: 82 BPM

## 2024-12-31 DIAGNOSIS — B02.9 HERPES ZOSTER WITHOUT COMPLICATION: Primary | ICD-10-CM

## 2024-12-31 PROCEDURE — 99213 OFFICE O/P EST LOW 20 MIN: CPT

## 2024-12-31 RX ORDER — VALACYCLOVIR HYDROCHLORIDE 1 G/1
1000 TABLET, FILM COATED ORAL EVERY 8 HOURS
Qty: 21 TABLET | Refills: 0 | Status: SHIPPED | OUTPATIENT
Start: 2024-12-31 | End: 2025-01-15

## 2024-12-31 ASSESSMENT — ENCOUNTER SYMPTOMS
EYE ITCHING: 0
WOUND: 0
FACIAL SWELLING: 0
COLOR CHANGE: 1
NUMBNESS: 0
CHILLS: 0
EYE DISCHARGE: 0
FEVER: 0
PHOTOPHOBIA: 0
FATIGUE: 0

## 2024-12-31 NOTE — ED PROVIDER NOTES
"Emergency Medicine Note  HPI   HISTORY OF PRESENT ILLNESS     Patient reports symptom onset x4 days.  Had a cold sore on her lip, took Valtrex, then developed \"a bump\" on her left cheek.   States it was tingling for the first few days, now it is just itchy and denies pain.  Had Valtrex at home, took from  until .   Has personal history of shingles.   Reports increased stress which is a known trigger.               Patient History   PAST HISTORY     Reviewed from Nursing Triage:       Past Medical History:   Diagnosis Date    ADD (attention deficit disorder)     HTN (hypertension) 2019    Motion sickness     Sleep apnea     Type 2 diabetes mellitus (CMS/HCC)        Past Surgical History   Procedure Laterality Date     section      Tubal ligation      Ultrasound Guided Hysteroscopy, D&C N/A 2023    Performed by Regi Hudson DO at  OR       Family History   Problem Relation Name Age of Onset    Breast cancer Biological Mother      Hodgkin's lymphoma Biological Father         Social History     Tobacco Use    Smoking status: Never    Smokeless tobacco: Never   Vaping Use    Vaping status: Never Used   Substance Use Topics    Alcohol use: Yes     Comment: occasional    Drug use: No         Review of Systems   REVIEW OF SYSTEMS     Review of Systems   Constitutional:  Negative for chills, fatigue and fever.   HENT:  Negative for facial swelling.    Eyes:  Negative for photophobia, discharge, itching and visual disturbance.   Skin:  Positive for color change (erythema of left cheek) and rash (left cheek). Negative for pallor and wound.   Neurological:  Negative for numbness.         VITALS     ED Vitals      Date/Time Temp Pulse Resp BP SpO2 [employer] [facility] [employer]   24 1023 36.6 °C (97.9 °F) 82 -- 142/94 97 % KP                         Physical Exam   PHYSICAL EXAM     Physical Exam  Constitutional:       General: She is not in acute distress.  Skin:     Findings: Erythema (left cheek rash) and " rash (left cheek) present. No lesion or wound. Rash is crusting. Rash is not vesicular.      Comments: Left rash 3cm x 3cm - raised and erythematous. No open or draining vesicles observed. Skin is dry and crusting.    Neurological:      General: No focal deficit present.      Mental Status: She is alert and oriented to person, place, and time. Mental status is at baseline.   Psychiatric:         Mood and Affect: Mood normal.         Behavior: Behavior normal.         Thought Content: Thought content normal.           PROCEDURES     Procedures     DATA     Results       None                No orders to display       Scoring tools                                  ED Course & MDM   MDM / ED COURSE / CLINICAL IMPRESSION / DISPO     Medical Decision Making  Shingles on left cheek 3cm x 3cm.   Has personal hx of shingles several times.   Patient has already taken 3 days of Valtrex at home.   RX valtrex 1gm q8hrs x7 days.   F/u as needed.            Clinical Impression      None                 Sonia Espinoza CRNP  12/31/24 6174

## 2024-12-31 NOTE — ED ATTESTATION NOTE
I was immediately available to provide supervision and direction for the care of the patient.    The patient was evaluated and managed by the nurse practitioner.       Chiki Gary,   12/31/24 124

## 2024-12-31 NOTE — DISCHARGE INSTRUCTIONS
Begin taking Valtrex 1gm - 1 tablet by mouth every 8 hours for 7 days.     Take care not to contaminate any other areas of the face, especially the eye.     Follow up as needed if symptoms do not improve or worsen.

## 2025-01-02 ENCOUNTER — TELEPHONE (OUTPATIENT)
Dept: PRIMARY CARE | Facility: CLINIC | Age: 55
End: 2025-01-02
Payer: COMMERCIAL

## 2025-01-02 NOTE — TELEPHONE ENCOUNTER
Pt has a rah/ red dev on her face and was seen in UC and was treated for Shingles. Pt is unable to get into derm until February. Pt would like a call back at [phone].

## 2025-01-09 RX ORDER — HYDROCORTISONE VALERATE CREAM 2 MG/G
CREAM TOPICAL 2 TIMES DAILY
Qty: 45 G | Refills: 0 | Status: SHIPPED | OUTPATIENT
Start: 2025-01-09 | End: 2026-01-09

## 2025-01-09 RX ORDER — CEPHALEXIN 500 MG/1
500 CAPSULE ORAL 3 TIMES DAILY
Qty: 21 CAPSULE | Refills: 0 | Status: SHIPPED | OUTPATIENT
Start: 2025-01-09 | End: 2025-01-16

## 2025-01-15 ENCOUNTER — OFFICE VISIT (OUTPATIENT)
Dept: PRIMARY CARE | Facility: CLINIC | Age: 55
End: 2025-01-15
Payer: COMMERCIAL

## 2025-01-15 VITALS
HEIGHT: 64 IN | TEMPERATURE: 98.6 F | WEIGHT: 154 LBS | RESPIRATION RATE: 16 BRPM | DIASTOLIC BLOOD PRESSURE: 80 MMHG | HEART RATE: 91 BPM | SYSTOLIC BLOOD PRESSURE: 136 MMHG | BODY MASS INDEX: 26.29 KG/M2 | OXYGEN SATURATION: 97 %

## 2025-01-15 DIAGNOSIS — R21 RASH: Primary | ICD-10-CM

## 2025-01-15 DIAGNOSIS — E11.9 DIABETES MELLITUS TYPE 2 IN NONOBESE (CMS/HCC): ICD-10-CM

## 2025-01-15 PROCEDURE — 99213 OFFICE O/P EST LOW 20 MIN: CPT | Performed by: FAMILY MEDICINE

## 2025-01-15 PROCEDURE — 3008F BODY MASS INDEX DOCD: CPT | Performed by: FAMILY MEDICINE

## 2025-01-15 PROCEDURE — 3075F SYST BP GE 130 - 139MM HG: CPT | Performed by: FAMILY MEDICINE

## 2025-01-15 PROCEDURE — 3079F DIAST BP 80-89 MM HG: CPT | Performed by: FAMILY MEDICINE

## 2025-01-15 RX ORDER — METRONIDAZOLE 7.5 MG/G
GEL TOPICAL 2 TIMES DAILY
Qty: 45 G | Refills: 0 | Status: SHIPPED | OUTPATIENT
Start: 2025-01-15 | End: 2026-01-15

## 2025-01-15 ASSESSMENT — ENCOUNTER SYMPTOMS
CHILLS: 0
FEVER: 0

## 2025-01-15 ASSESSMENT — PATIENT HEALTH QUESTIONNAIRE - PHQ9: SUM OF ALL RESPONSES TO PHQ9 QUESTIONS 1 & 2: 0

## 2025-01-15 NOTE — PROGRESS NOTES
"Subjective      Patient ID: Elda Curiel is a 54 y.o. female.  1970      Facial rash persisting x 2 months  Not painful  [place] [facility]          The following have been reviewed and updated as appropriate in this visit:   Problems       Review of Systems   Constitutional:  Negative for chills and fever.   Skin:  Positive for rash.       Objective     Vitals:    01/15/25 1442   BP: 136/80   BP Location: Left upper arm   Patient Position: Sitting   Pulse: 91   Resp: 16   Temp: 37 °C (98.6 °F)   TempSrc: Temporal   SpO2: 97%   Weight: 69.9 kg (154 lb)   Height: 1.626 m (5' 4\")     Body mass index is 26.43 kg/m².    Physical Exam  Vitals and nursing note reviewed.   Constitutional:       Appearance: Normal appearance.   Eyes:      General: No scleral icterus.  Skin:     General: Skin is warm and dry.      Findings: Rash present.      Comments: Erythema face - cheeks/chin  No pustules or vesicles  NT   Neurological:      Mental Status: She is alert.         Assessment & Plan  Rash  Suspect rosacea  Metrogel crm  Derm consult       Diabetes mellitus type 2 in nonobese (CMS/HCC)               "

## 2025-03-05 RX ORDER — DEXTROAMPHETAMINE SACCHARATE, AMPHETAMINE ASPARTATE MONOHYDRATE, DEXTROAMPHETAMINE SULFATE AND AMPHETAMINE SULFATE 2.5; 2.5; 2.5; 2.5 MG/1; MG/1; MG/1; MG/1
10 CAPSULE, EXTENDED RELEASE ORAL EVERY MORNING
Qty: 30 CAPSULE | Refills: 0 | Status: SHIPPED | OUTPATIENT
Start: 2025-03-05 | End: 2025-05-15

## 2025-03-05 RX ORDER — ZOLPIDEM TARTRATE 5 MG/1
5 TABLET ORAL NIGHTLY PRN
Qty: 30 TABLET | Refills: 0 | Status: SHIPPED | OUTPATIENT
Start: 2025-03-05 | End: 2025-09-01

## 2025-03-05 NOTE — TELEPHONE ENCOUNTER
Medicine Refill Request    Last Office Visit: 1/15/2025   Last Consult Visit: Visit date not found  Last Telemedicine Visit: 2/23/2021 Rc Whitten, DO    Next Appointment: Visit date not found      Current Outpatient Medications:     amphetamine-dextroamphetamine XR (ADDERALL XR) 10 mg 24 hr capsule, Take 1 capsule (10 mg total) by mouth every morning., Disp: 30 capsule, Rfl: 0    hydrocortisone (WESTCORT) 0.2 % cream, Apply topically 2 (two) times a day., Disp: 45 g, Rfl: 0    lancets (onetouch ultrasoft) misc, 1 lancet 2 (two) times a day., Disp: 100 each, Rfl: 3    metroNIDAZOLE (METROGEL) 0.75 % topical gel, Apply topically 2 (two) times a day., Disp: 45 g, Rfl: 0    ONETOUCH VERIO REFLECT METER misc, 2 (two) times a day. for testing, Disp: , Rfl:     ONETOUCH VERIO TEST STRIPS strip, TEST TWICE A DAY, Disp: 50 strip, Rfl: 7    semaglutide (OZEMPIC) 1 mg/dose (4 mg/3 mL) subcutaneous injection, Inject 1 mg under the skin every (seven) 7 days., Disp: 3 mL, Rfl: 5    valACYclovir (VALTREX) 1 gram tablet, 2 tabs po BID x 2 doses prn cold sores, Disp: 30 tablet, Rfl: 0    valACYclovir (VALTREX) 1 gram tablet, Take 1 tablet (1,000 mg total) by mouth every 8 (eight) hours for 7 days., Disp: 21 tablet, Rfl: 0    zolpidem (AMBIEN) 5 mg tablet, Take 1 tablet (5 mg total) by mouth nightly as needed for sleep., Disp: 30 tablet, Rfl: 1      BP Readings from Last 3 Encounters:   01/15/25 136/80   12/31/24 (!) 142/94   10/02/24 135/85       Recent Lab results:  Lab Results   Component Value Date    CHOL 196 04/01/2024   ,   Lab Results   Component Value Date    HDL 52 04/01/2024   ,   Lab Results   Component Value Date    LDLCALC 120 (H) 04/01/2024   ,   Lab Results   Component Value Date    TRIG 138 04/01/2024        Lab Results   Component Value Date    GLUCOSE 87 11/05/2024   ,   Lab Results   Component Value Date    HGBA1C 4.8 11/05/2024         Lab Results   Component Value Date    CREATININE 0.72 11/05/2024        Lab Results   Component Value Date    TSH 1.090 04/01/2024           Lab Results   Component Value Date    HGBA1C 4.8 11/05/2024       Lab Results   Component Value Date    EGFR 101 11/05/2024    EGFR >60.0 11/07/2023    EGFR 102 01/12/2023

## 2025-03-10 ENCOUNTER — TELEPHONE (OUTPATIENT)
Dept: PRIMARY CARE | Facility: CLINIC | Age: 55
End: 2025-03-10
Payer: COMMERCIAL

## 2025-03-10 NOTE — TELEPHONE ENCOUNTER
Symptom started Sat - sinus congestion, post nasal drip - OTC meds dayquil/nyquil - NO fevers or cough - PT flying back WED - RN advised Mucinex BID, Sudafed prior to flight Tues and WED - hydration

## 2025-03-10 NOTE — TELEPHONE ENCOUNTER
Patient is vacationing in [facility] and is having sinus congestion. She is requesting a script. Please call patient to let her know if a script will be sent. If f PCP is agreeable to sent script,she is in the process of finding the closest pharmacy and will have that information ready when she is called back.

## 2025-04-06 DIAGNOSIS — E11.9 DIABETES MELLITUS TYPE 2 IN NONOBESE (CMS/HCC): ICD-10-CM

## 2025-04-07 RX ORDER — SEMAGLUTIDE 1.34 MG/ML
INJECTION, SOLUTION SUBCUTANEOUS
Qty: 3 ML | Refills: 2 | Status: SHIPPED | OUTPATIENT
Start: 2025-04-07

## 2025-04-07 NOTE — TELEPHONE ENCOUNTER
Medicine Refill Request    Last Office Visit: 1/15/2025   Last Consult Visit: Visit date not found  Last Telemedicine Visit: Visit date not found    Next Appointment: Visit date not found      Current Outpatient Medications:     amphetamine-dextroamphetamine XR (ADDERALL XR) 10 mg 24 hr capsule, Take 1 capsule (10 mg total) by mouth every morning., Disp: 30 capsule, Rfl: 0    hydrocortisone (WESTCORT) 0.2 % cream, Apply topically 2 (two) times a day., Disp: 45 g, Rfl: 0    lancets (onetouch ultrasoft) misc, 1 lancet 2 (two) times a day., Disp: 100 each, Rfl: 3    metroNIDAZOLE (METROGEL) 0.75 % topical gel, Apply topically 2 (two) times a day., Disp: 45 g, Rfl: 0    ONETOUCH VERIO REFLECT METER misc, 2 (two) times a day. for testing, Disp: , Rfl:     ONETOUCH VERIO TEST STRIPS strip, TEST TWICE A DAY, Disp: 50 strip, Rfl: 7    semaglutide (OZEMPIC) 1 mg/dose (4 mg/3 mL) subcutaneous injection, Inject 1 mg under the skin every (seven) 7 days., Disp: 3 mL, Rfl: 5    valACYclovir (VALTREX) 1 gram tablet, 2 tabs po BID x 2 doses prn cold sores, Disp: 30 tablet, Rfl: 0    valACYclovir (VALTREX) 1 gram tablet, Take 1 tablet (1,000 mg total) by mouth every 8 (eight) hours for 7 days., Disp: 21 tablet, Rfl: 0    zolpidem (AMBIEN) 5 mg tablet, Take 1 tablet (5 mg total) by mouth nightly as needed for sleep., Disp: 30 tablet, Rfl: 0      BP Readings from Last 3 Encounters:   01/15/25 136/80   12/31/24 (!) 142/94   10/02/24 135/85       Recent Lab results:  Lab Results   Component Value Date    CHOL 196 04/01/2024   ,   Lab Results   Component Value Date    HDL 52 04/01/2024   ,   Lab Results   Component Value Date    LDLCALC 120 (H) 04/01/2024   ,   Lab Results   Component Value Date    TRIG 138 04/01/2024        Lab Results   Component Value Date    GLUCOSE 87 11/05/2024   ,   Lab Results   Component Value Date    HGBA1C 4.8 11/05/2024         Lab Results   Component Value Date    CREATININE 0.72 11/05/2024       Lab Results    Component Value Date    TSH 1.090 04/01/2024           Lab Results   Component Value Date    HGBA1C 4.8 11/05/2024       Lab Results   Component Value Date    EGFR 101 11/05/2024    EGFR >60.0 11/07/2023    EGFR 102 01/12/2023

## 2025-04-24 DIAGNOSIS — E11.9 DIABETES MELLITUS TYPE 2 IN NONOBESE (CMS/HCC): Primary | ICD-10-CM

## 2025-04-29 ENCOUNTER — HOSPITAL ENCOUNTER (OUTPATIENT)
Dept: RADIOLOGY | Age: 55
Discharge: HOME | End: 2025-04-29
Attending: GENERAL ACUTE CARE HOSPITAL
Payer: COMMERCIAL

## 2025-04-29 ENCOUNTER — TRANSCRIBE ORDERS (OUTPATIENT)
Dept: RADIOLOGY | Age: 55
End: 2025-04-29

## 2025-04-29 DIAGNOSIS — Z12.31 OTHER SCREENING MAMMOGRAM: Primary | ICD-10-CM

## 2025-04-29 DIAGNOSIS — Z12.31 OTHER SCREENING MAMMOGRAM: ICD-10-CM

## 2025-04-29 PROCEDURE — 77063 BREAST TOMOSYNTHESIS BI: CPT

## 2025-05-10 LAB
ALBUMIN SERPL-MCNC: 4.5 G/DL (ref 3.8–4.9)
ALP SERPL-CCNC: 69 IU/L (ref 44–121)
ALT SERPL-CCNC: 20 IU/L (ref 0–32)
AST SERPL-CCNC: 23 IU/L (ref 0–40)
BASOPHILS # BLD AUTO: 0.1 X10E3/UL (ref 0–0.2)
BASOPHILS NFR BLD AUTO: 2 %
BILIRUB SERPL-MCNC: 0.9 MG/DL (ref 0–1.2)
BUN SERPL-MCNC: 10 MG/DL (ref 6–24)
BUN/CREAT SERPL: 15 (ref 9–23)
CALCIUM SERPL-MCNC: 9.2 MG/DL (ref 8.7–10.2)
CHLORIDE SERPL-SCNC: 104 MMOL/L (ref 96–106)
CHOLEST SERPL-MCNC: 183 MG/DL (ref 100–199)
CO2 SERPL-SCNC: 20 MMOL/L (ref 20–29)
CREAT SERPL-MCNC: 0.65 MG/DL (ref 0.57–1)
EGFRCR SERPLBLD CKD-EPI 2021: 106 ML/MIN/1.73
EOSINOPHIL # BLD AUTO: 0.2 X10E3/UL (ref 0–0.4)
EOSINOPHIL NFR BLD AUTO: 4 %
ERYTHROCYTE [DISTWIDTH] IN BLOOD BY AUTOMATED COUNT: 13.1 % (ref 11.7–15.4)
GLOBULIN SER CALC-MCNC: 2.5 G/DL (ref 1.5–4.5)
GLUCOSE SERPL-MCNC: 88 MG/DL (ref 70–99)
HBA1C MFR BLD: 4.8 % (ref 4.8–5.6)
HCT VFR BLD AUTO: 43.6 % (ref 34–46.6)
HDLC SERPL-MCNC: 50 MG/DL
HGB BLD-MCNC: 14.5 G/DL (ref 11.1–15.9)
IMM GRANULOCYTES # BLD AUTO: 0 X10E3/UL (ref 0–0.1)
IMM GRANULOCYTES NFR BLD AUTO: 0 %
LDLC SERPL CALC-MCNC: 108 MG/DL (ref 0–99)
LYMPHOCYTES # BLD AUTO: 2 X10E3/UL (ref 0.7–3.1)
LYMPHOCYTES NFR BLD AUTO: 38 %
MCH RBC QN AUTO: 30.1 PG (ref 26.6–33)
MCHC RBC AUTO-ENTMCNC: 33.3 G/DL (ref 31.5–35.7)
MCV RBC AUTO: 91 FL (ref 79–97)
MONOCYTES # BLD AUTO: 0.5 X10E3/UL (ref 0.1–0.9)
MONOCYTES NFR BLD AUTO: 9 %
NEUTROPHILS # BLD AUTO: 2.5 X10E3/UL (ref 1.4–7)
NEUTROPHILS NFR BLD AUTO: 47 %
PLATELET # BLD AUTO: 292 X10E3/UL (ref 150–450)
POTASSIUM SERPL-SCNC: 4.5 MMOL/L (ref 3.5–5.2)
PROT SERPL-MCNC: 7 G/DL (ref 6–8.5)
RBC # BLD AUTO: 4.82 X10E6/UL (ref 3.77–5.28)
SODIUM SERPL-SCNC: 141 MMOL/L (ref 134–144)
SPECIMEN STATUS: NORMAL
TRIGL SERPL-MCNC: 139 MG/DL (ref 0–149)
TSH SERPL DL<=0.005 MIU/L-ACNC: 1.06 UIU/ML (ref 0.45–4.5)
VLDLC SERPL CALC-MCNC: 25 MG/DL (ref 5–40)
WBC # BLD AUTO: 5.2 X10E3/UL (ref 3.4–10.8)

## 2025-05-12 ENCOUNTER — RESULTS FOLLOW-UP (OUTPATIENT)
Dept: PRIMARY CARE | Facility: CLINIC | Age: 55
End: 2025-05-12

## 2025-05-14 ENCOUNTER — TELEPHONE (OUTPATIENT)
Dept: PRIMARY CARE | Facility: CLINIC | Age: 55
End: 2025-05-14
Payer: COMMERCIAL

## 2025-05-15 ENCOUNTER — OFFICE VISIT (OUTPATIENT)
Dept: PRIMARY CARE | Facility: CLINIC | Age: 55
End: 2025-05-15
Payer: COMMERCIAL

## 2025-05-15 VITALS
OXYGEN SATURATION: 98 % | TEMPERATURE: 97.9 F | HEART RATE: 80 BPM | SYSTOLIC BLOOD PRESSURE: 120 MMHG | DIASTOLIC BLOOD PRESSURE: 84 MMHG | BODY MASS INDEX: 27.39 KG/M2 | RESPIRATION RATE: 16 BRPM | HEIGHT: 64 IN | WEIGHT: 160.4 LBS

## 2025-05-15 DIAGNOSIS — E11.9 DIABETES MELLITUS TYPE 2 IN NONOBESE (CMS/HCC): ICD-10-CM

## 2025-05-15 DIAGNOSIS — R51.9 FACIAL PAIN: Primary | ICD-10-CM

## 2025-05-15 PROCEDURE — 99213 OFFICE O/P EST LOW 20 MIN: CPT | Performed by: FAMILY MEDICINE

## 2025-05-15 PROCEDURE — 3008F BODY MASS INDEX DOCD: CPT | Performed by: FAMILY MEDICINE

## 2025-05-15 PROCEDURE — 3074F SYST BP LT 130 MM HG: CPT | Performed by: FAMILY MEDICINE

## 2025-05-15 PROCEDURE — 3079F DIAST BP 80-89 MM HG: CPT | Performed by: FAMILY MEDICINE

## 2025-05-31 ENCOUNTER — HOSPITAL ENCOUNTER (OUTPATIENT)
Facility: CLINIC | Age: 55
Discharge: HOME | End: 2025-05-31
Attending: FAMILY MEDICINE
Payer: COMMERCIAL

## 2025-05-31 VITALS
TEMPERATURE: 98.4 F | OXYGEN SATURATION: 99 % | SYSTOLIC BLOOD PRESSURE: 137 MMHG | HEART RATE: 79 BPM | DIASTOLIC BLOOD PRESSURE: 87 MMHG

## 2025-05-31 DIAGNOSIS — R35.0 FREQUENCY OF URINATION: Primary | ICD-10-CM

## 2025-05-31 LAB
BILIRUBIN, POC: NEGATIVE
BLOOD URINE, POC: NEGATIVE
CLARITY, POC: CLEAR
COLOR, POC: YELLOW
EXPIRATION DATE: NORMAL
GLUCOSE URINE, POC: NEGATIVE
KETONES, POC: NEGATIVE
LEUKOCYTE EST, POC: NEGATIVE
Lab: NORMAL
NITRITE, POC: NEGATIVE
PH, POC: 5
POCT MANUFACTURER: NORMAL
PROTEIN, POC: NORMAL
SPECIFIC GRAVITY, POC: 1.02
UROBILINOGEN, POC: 0.2

## 2025-05-31 PROCEDURE — 81002 URINALYSIS NONAUTO W/O SCOPE: CPT | Performed by: FAMILY MEDICINE

## 2025-05-31 PROCEDURE — 99213 OFFICE O/P EST LOW 20 MIN: CPT | Performed by: FAMILY MEDICINE

## 2025-05-31 ASSESSMENT — ENCOUNTER SYMPTOMS
CHILLS: 0
FLANK PAIN: 0
HEMATURIA: 0
FEVER: 0
VOMITING: 0
DYSURIA: 0
BACK PAIN: 0
ABDOMINAL PAIN: 0
DIAPHORESIS: 0
NAUSEA: 0
FREQUENCY: 1

## 2025-05-31 NOTE — ED PROVIDER NOTES
History  Chief Complaint   Patient presents with    UTI     Lower abnormal pressure - frequent urination , symptoms started 3 days ago      Started 3-4d ago with frequency, lower abd pressure. No change but sxs come and go. Took advil last night for first time then fell asleep.      History provided by:  Patient  UTI  Associated symptoms: no abdominal pain, no fever, no nausea and no vomiting        Past Medical History:   Diagnosis Date    ADD (attention deficit disorder)     HTN (hypertension) 2019    Motion sickness     Sleep apnea     Type 2 diabetes mellitus (CMS/HCC)        Past Surgical History   Procedure Laterality Date     section      Tubal ligation      Ultrasound Guided Hysteroscopy, D&C N/A 2023    Performed by Regi Hudson DO at  OR       Family History   Problem Relation Name Age of Onset    Breast cancer Biological Mother      Hodgkin's lymphoma Biological Father         Social History     Tobacco Use    Smoking status: Never    Smokeless tobacco: Never   Vaping Use    Vaping status: Never Used   Substance Use Topics    Alcohol use: Yes     Comment: occasional    Drug use: No       Review of Systems   Constitutional:  Negative for chills, diaphoresis and fever.   Gastrointestinal:  Negative for abdominal pain, nausea and vomiting.   Genitourinary:  Positive for frequency. Negative for dysuria, flank pain and hematuria.   Musculoskeletal:  Negative for back pain.       Physical Exam  ED Triage Vitals [25 0811]   Temp Heart Rate Resp BP SpO2   36.9 °C (98.4 °F) 79 -- 137/87 99 %      Temp Source Heart Rate Source Patient Position BP Location FiO2 (%) (Set)   Oral -- Sitting Right upper arm --       Physical Exam  Vitals and nursing note reviewed.   Constitutional:       General: She is not in acute distress.     Appearance: Normal appearance.   Pulmonary:      Effort: Pulmonary effort is normal.   Abdominal:      General: Bowel sounds are normal.      Palpations:  Abdomen is soft. There is no mass.      Tenderness: There is no abdominal tenderness. There is no right CVA tenderness, left CVA tenderness, guarding or rebound.   Musculoskeletal:      Cervical back: Neck supple.   Neurological:      Mental Status: She is alert.           Procedures  Procedures    UC Course       Medical Decision Making  Frequency  With neg UA and sxs come and go will check Cx and hold on abx at this time  Inc fluids  Follow sxs                     Asif Melvin MD  06/01/25 2363

## 2025-06-02 ENCOUNTER — OFFICE VISIT (OUTPATIENT)
Dept: PRIMARY CARE | Facility: CLINIC | Age: 55
End: 2025-06-02
Payer: COMMERCIAL

## 2025-06-02 ENCOUNTER — HOSPITAL ENCOUNTER (EMERGENCY)
Facility: HOSPITAL | Age: 55
Discharge: HOME | End: 2025-06-03
Attending: EMERGENCY MEDICINE | Admitting: EMERGENCY MEDICINE
Payer: COMMERCIAL

## 2025-06-02 VITALS
SYSTOLIC BLOOD PRESSURE: 143 MMHG | TEMPERATURE: 97.6 F | HEART RATE: 78 BPM | WEIGHT: 160 LBS | BODY MASS INDEX: 28.35 KG/M2 | OXYGEN SATURATION: 98 % | HEIGHT: 63 IN | DIASTOLIC BLOOD PRESSURE: 94 MMHG

## 2025-06-02 DIAGNOSIS — R10.30 LOWER ABDOMINAL PAIN: Primary | ICD-10-CM

## 2025-06-02 DIAGNOSIS — N88.2 CERVICAL STENOSIS (UTERINE CERVIX): ICD-10-CM

## 2025-06-02 DIAGNOSIS — R10.32 LEFT LOWER QUADRANT ABDOMINAL PAIN: Primary | ICD-10-CM

## 2025-06-02 LAB
ALBUMIN SERPL-MCNC: 4.4 G/DL (ref 3.5–5.7)
ALP SERPL-CCNC: 55 IU/L (ref 34–125)
ALT SERPL-CCNC: 27 IU/L (ref 7–52)
ANION GAP SERPL CALC-SCNC: 5 MEQ/L (ref 3–15)
AST SERPL-CCNC: 23 IU/L (ref 13–39)
BASOPHILS # BLD: 0.06 K/UL (ref 0.01–0.1)
BASOPHILS NFR BLD: 0.6 %
BILIRUB SERPL-MCNC: 1.3 MG/DL (ref 0.3–1.2)
BILIRUB UR QL STRIP.AUTO: NEGATIVE MG/DL
BUN SERPL-MCNC: 7 MG/DL (ref 7–25)
CALCIUM SERPL-MCNC: 8.9 MG/DL (ref 8.6–10.3)
CHLORIDE SERPL-SCNC: 104 MEQ/L (ref 98–107)
CLARITY UR REFRACT.AUTO: CLEAR
CO2 SERPL-SCNC: 30 MEQ/L (ref 21–31)
COLOR UR AUTO: COLORLESS
CREAT SERPL-MCNC: 0.6 MG/DL (ref 0.6–1.2)
DIFFERENTIAL METHOD BLD: ABNORMAL
EGFRCR SERPLBLD CKD-EPI 2021: >60 ML/MIN/1.73M*2
EOSINOPHIL # BLD: 0.18 K/UL (ref 0.04–0.36)
EOSINOPHIL NFR BLD: 1.8 %
ERYTHROCYTE [DISTWIDTH] IN BLOOD BY AUTOMATED COUNT: 12 % (ref 11.7–14.4)
GLUCOSE SERPL-MCNC: 120 MG/DL (ref 70–99)
GLUCOSE UR STRIP.AUTO-MCNC: NEGATIVE MG/DL
HCT VFR BLD AUTO: 40.2 % (ref 35–45)
HGB BLD-MCNC: 14.1 G/DL (ref 11.8–15.7)
HGB UR QL STRIP.AUTO: NEGATIVE
IMM GRANULOCYTES # BLD AUTO: 0.03 K/UL (ref 0–0.08)
IMM GRANULOCYTES NFR BLD AUTO: 0.3 %
KETONES UR STRIP.AUTO-MCNC: NEGATIVE MG/DL
LEUKOCYTE ESTERASE UR QL STRIP.AUTO: NEGATIVE
LYMPHOCYTES # BLD: 2.03 K/UL (ref 1.2–3.5)
LYMPHOCYTES NFR BLD: 20.2 %
MCH RBC QN AUTO: 30.6 PG (ref 28–33.2)
MCHC RBC AUTO-ENTMCNC: 35.1 G/DL (ref 32.2–35.5)
MCV RBC AUTO: 87.2 FL (ref 83–98)
MONOCYTES # BLD: 0.65 K/UL (ref 0.28–0.8)
MONOCYTES NFR BLD: 6.5 %
NEUTROPHILS # BLD: 7.09 K/UL (ref 1.7–7)
NEUTS SEG NFR BLD: 70.6 %
NITRITE UR QL STRIP.AUTO: NEGATIVE
NRBC BLD-RTO: 0 %
PH UR STRIP.AUTO: 6.5 [PH]
PLATELET # BLD AUTO: 249 K/UL (ref 150–369)
PMV BLD AUTO: 8.8 FL (ref 9.4–12.3)
POTASSIUM SERPL-SCNC: 3.4 MEQ/L (ref 3.5–5.1)
PROT SERPL-MCNC: 7.3 G/DL (ref 6–8.2)
PROT UR QL STRIP.AUTO: NEGATIVE
RBC # BLD AUTO: 4.61 M/UL (ref 3.93–5.22)
SODIUM SERPL-SCNC: 139 MEQ/L (ref 136–145)
SP GR UR REFRACT.AUTO: 1.01
UROBILINOGEN UR STRIP-ACNC: 0.2 EU/DL
WBC # BLD AUTO: 10.04 K/UL (ref 3.8–10.5)

## 2025-06-02 PROCEDURE — 3E0333Z INTRODUCTION OF ANTI-INFLAMMATORY INTO PERIPHERAL VEIN, PERCUTANEOUS APPROACH: ICD-10-PCS | Performed by: EMERGENCY MEDICINE

## 2025-06-02 PROCEDURE — 3008F BODY MASS INDEX DOCD: CPT | Performed by: FAMILY MEDICINE

## 2025-06-02 PROCEDURE — 99284 EMERGENCY DEPT VISIT MOD MDM: CPT | Mod: 25

## 2025-06-02 PROCEDURE — 99213 OFFICE O/P EST LOW 20 MIN: CPT | Performed by: FAMILY MEDICINE

## 2025-06-02 PROCEDURE — 36415 COLL VENOUS BLD VENIPUNCTURE: CPT

## 2025-06-02 PROCEDURE — 80053 COMPREHEN METABOLIC PANEL: CPT | Performed by: EMERGENCY MEDICINE

## 2025-06-02 PROCEDURE — 3080F DIAST BP >= 90 MM HG: CPT | Performed by: FAMILY MEDICINE

## 2025-06-02 PROCEDURE — 96361 HYDRATE IV INFUSION ADD-ON: CPT

## 2025-06-02 PROCEDURE — 3077F SYST BP >= 140 MM HG: CPT | Performed by: FAMILY MEDICINE

## 2025-06-02 PROCEDURE — 3E0337Z INTRODUCTION OF ELECTROLYTIC AND WATER BALANCE SUBSTANCE INTO PERIPHERAL VEIN, PERCUTANEOUS APPROACH: ICD-10-PCS | Performed by: EMERGENCY MEDICINE

## 2025-06-02 PROCEDURE — 85025 COMPLETE CBC W/AUTO DIFF WBC: CPT | Performed by: EMERGENCY MEDICINE

## 2025-06-02 PROCEDURE — 81003 URINALYSIS AUTO W/O SCOPE: CPT

## 2025-06-02 PROCEDURE — 25800000 HC PHARMACY IV SOLUTIONS: Performed by: EMERGENCY MEDICINE

## 2025-06-02 PROCEDURE — 85025 COMPLETE CBC W/AUTO DIFF WBC: CPT

## 2025-06-02 PROCEDURE — 63600000 HC DRUGS/DETAIL CODE: Mod: JZ | Performed by: EMERGENCY MEDICINE

## 2025-06-02 PROCEDURE — 96374 THER/PROPH/DIAG INJ IV PUSH: CPT

## 2025-06-02 PROCEDURE — 82040 ASSAY OF SERUM ALBUMIN: CPT

## 2025-06-02 PROCEDURE — 81003 URINALYSIS AUTO W/O SCOPE: CPT | Performed by: EMERGENCY MEDICINE

## 2025-06-02 RX ORDER — AMOXICILLIN AND CLAVULANATE POTASSIUM 875; 125 MG/1; MG/1
1 TABLET, FILM COATED ORAL 2 TIMES DAILY
Qty: 14 TABLET | Refills: 0 | Status: SHIPPED | OUTPATIENT
Start: 2025-06-02 | End: 2025-06-09

## 2025-06-02 RX ORDER — KETOROLAC TROMETHAMINE 15 MG/ML
15 INJECTION, SOLUTION INTRAMUSCULAR; INTRAVENOUS ONCE
Status: COMPLETED | OUTPATIENT
Start: 2025-06-02 | End: 2025-06-02

## 2025-06-02 RX ADMIN — KETOROLAC TROMETHAMINE 15 MG: 15 INJECTION, SOLUTION INTRAMUSCULAR; INTRAVENOUS at 23:08

## 2025-06-02 RX ADMIN — SODIUM CHLORIDE 1000 ML: 9 INJECTION, SOLUTION INTRAVENOUS at 23:08

## 2025-06-02 ASSESSMENT — ENCOUNTER SYMPTOMS
SORE THROAT: 0
POLYDIPSIA: 0
HEMATURIA: 0
FEVER: 0
SHORTNESS OF BREATH: 0
VOMITING: 0
ANAL BLEEDING: 0
WHEEZING: 0
COUGH: 0
FEVER: 0
NUMBNESS: 0
FACIAL SWELLING: 0
DIARRHEA: 0
NAUSEA: 0
BACK PAIN: 0
DIZZINESS: 0
EYE REDNESS: 0
PALPITATIONS: 0
HEADACHES: 0
WEAKNESS: 0
RECTAL PAIN: 0
DYSURIA: 0
FREQUENCY: 1
NECK PAIN: 0
POLYPHAGIA: 0
SHORTNESS OF BREATH: 0
SORE THROAT: 0
VOMITING: 0
CHILLS: 0
EYE DISCHARGE: 0
CONSTIPATION: 0
WEAKNESS: 0
DYSURIA: 0
HEADACHES: 0
CHILLS: 0
COUGH: 0
ABDOMINAL PAIN: 1
STRIDOR: 0
ABDOMINAL PAIN: 1
BLOOD IN STOOL: 0
ABDOMINAL DISTENTION: 1

## 2025-06-02 NOTE — PROGRESS NOTES
"Subjective      Patient ID: Elda Curiel is a 54 y.o. female.  1970      LLQ pain        The following have been reviewed and updated as appropriate in this visit:        Review of Systems   Constitutional:  Negative for chills and fever.   HENT:  Negative for dental problem and sore throat.    Eyes:  Negative for visual disturbance.   Respiratory:  Negative for cough, shortness of breath, wheezing and stridor.    Cardiovascular:  Negative for chest pain, palpitations and leg swelling.   Gastrointestinal:  Positive for abdominal distention and abdominal pain. Negative for anal bleeding, blood in stool, constipation, diarrhea, nausea, rectal pain and vomiting.   Endocrine: Negative for cold intolerance, heat intolerance, polydipsia, polyphagia and polyuria.   Genitourinary:  Positive for frequency. Negative for dysuria, hematuria, vaginal bleeding and vaginal discharge.   Musculoskeletal:  Negative for gait problem.   Skin:  Negative for rash.   Neurological:  Negative for dizziness, syncope, weakness, numbness and headaches.       Objective     Vitals:    06/02/25 1414   BP: (!) 143/94   BP Location: Left upper arm   Patient Position: Sitting   Pulse: 78   Temp: 36.4 °C (97.6 °F)   TempSrc: Temporal   SpO2: 98%   Weight: 72.6 kg (160 lb)   Height: 1.61 m (5' 3.39\")     Body mass index is 28 kg/m².    Physical Exam  Vitals and nursing note reviewed.   Constitutional:       Appearance: Normal appearance.   Eyes:      General: No scleral icterus.  Abdominal:      General: Bowel sounds are normal. There is no distension.      Palpations: Abdomen is soft. There is no mass.      Tenderness: There is abdominal tenderness in the suprapubic area and left lower quadrant. There is no right CVA tenderness, left CVA tenderness, guarding or rebound.      Hernia: No hernia is present.   Musculoskeletal:      Right lower leg: No edema.      Left lower leg: No edema.   Skin:     General: Skin is warm and dry.      " Findings: No rash.   Neurological:      General: No focal deficit present.      Mental Status: She is alert.   Psychiatric:         Mood and Affect: Mood normal.         Behavior: Behavior normal.         Assessment & Plan  Lower abdominal pain  Urine cx pending  R/o uti  divertic  ovarian cyst  Augmentin  To ED if worsens

## 2025-06-03 ENCOUNTER — APPOINTMENT (EMERGENCY)
Dept: RADIOLOGY | Facility: HOSPITAL | Age: 55
End: 2025-06-03
Attending: EMERGENCY MEDICINE
Payer: COMMERCIAL

## 2025-06-03 ENCOUNTER — RESULTS FOLLOW-UP (OUTPATIENT)
Dept: URGENT CARE | Facility: CLINIC | Age: 55
End: 2025-06-03

## 2025-06-03 VITALS
DIASTOLIC BLOOD PRESSURE: 80 MMHG | HEART RATE: 83 BPM | RESPIRATION RATE: 18 BRPM | OXYGEN SATURATION: 100 % | SYSTOLIC BLOOD PRESSURE: 132 MMHG | TEMPERATURE: 97.4 F

## 2025-06-03 DIAGNOSIS — R10.30 LOWER ABDOMINAL PAIN: Primary | ICD-10-CM

## 2025-06-03 LAB
BACTERIA UR CULT: NORMAL
BACTERIA UR CULT: NORMAL

## 2025-06-03 PROCEDURE — 63600000 HC DRUGS/DETAIL CODE: Mod: JZ | Performed by: EMERGENCY MEDICINE

## 2025-06-03 PROCEDURE — 63700000 HC SELF-ADMINISTRABLE DRUG: Performed by: EMERGENCY MEDICINE

## 2025-06-03 PROCEDURE — 96376 TX/PRO/DX INJ SAME DRUG ADON: CPT

## 2025-06-03 PROCEDURE — 63600105 HC IODINE BASED CONTRAST: Mod: JZ | Performed by: EMERGENCY MEDICINE

## 2025-06-03 PROCEDURE — 96375 TX/PRO/DX INJ NEW DRUG ADDON: CPT

## 2025-06-03 PROCEDURE — 74177 CT ABD & PELVIS W/CONTRAST: CPT

## 2025-06-03 PROCEDURE — 3E033NZ INTRODUCTION OF ANALGESICS, HYPNOTICS, SEDATIVES INTO PERIPHERAL VEIN, PERCUTANEOUS APPROACH: ICD-10-PCS | Performed by: EMERGENCY MEDICINE

## 2025-06-03 RX ORDER — DICYCLOMINE HYDROCHLORIDE 20 MG/1
20 TABLET ORAL 2 TIMES DAILY
Qty: 8 TABLET | Refills: 0 | Status: SHIPPED | OUTPATIENT
Start: 2025-06-03 | End: 2025-06-07

## 2025-06-03 RX ORDER — IOPAMIDOL 755 MG/ML
100 INJECTION, SOLUTION INTRAVASCULAR
Status: COMPLETED | OUTPATIENT
Start: 2025-06-03 | End: 2025-06-03

## 2025-06-03 RX ORDER — MORPHINE SULFATE 4 MG/ML
4 INJECTION, SOLUTION INTRAMUSCULAR; INTRAVENOUS ONCE
Status: COMPLETED | OUTPATIENT
Start: 2025-06-03 | End: 2025-06-03

## 2025-06-03 RX ORDER — NAPROXEN 500 MG/1
500 TABLET ORAL
Qty: 6 TABLET | Refills: 0 | Status: SHIPPED | OUTPATIENT
Start: 2025-06-03 | End: 2025-06-06

## 2025-06-03 RX ORDER — DICYCLOMINE HYDROCHLORIDE 10 MG/1
20 CAPSULE ORAL ONCE
Status: COMPLETED | OUTPATIENT
Start: 2025-06-03 | End: 2025-06-03

## 2025-06-03 RX ORDER — MORPHINE SULFATE 15 MG/1
15 TABLET ORAL ONCE
Refills: 0 | Status: DISCONTINUED | OUTPATIENT
Start: 2025-06-03 | End: 2025-06-03 | Stop reason: HOSPADM

## 2025-06-03 RX ADMIN — DICYCLOMINE HYDROCHLORIDE 20 MG: 10 CAPSULE ORAL at 04:47

## 2025-06-03 RX ADMIN — IOPAMIDOL 100 ML: 755 INJECTION, SOLUTION INTRAVENOUS at 02:38

## 2025-06-03 RX ADMIN — MORPHINE SULFATE 4 MG: 4 INJECTION, SOLUTION INTRAMUSCULAR; INTRAVENOUS at 04:47

## 2025-06-03 RX ADMIN — MORPHINE SULFATE 4 MG: 4 INJECTION, SOLUTION INTRAMUSCULAR; INTRAVENOUS at 00:34

## 2025-06-03 NOTE — ED PROVIDER NOTES
Emergency Medicine Note  HPI   HISTORY OF PRESENT ILLNESS     Patient is a 54-year-old female past medical history of type 2 diabetes who is on Ozempic presents with left lower quadrant abdominal pain for the past 2 days.  Patient states she went to urgent care had a urine tested that showed no signs of infection.  Patient is denying any chest pain or shortness of breath no upper abdominal pain no current back pain no fever no chills.  Patient has had C-sections in the past but no other abdominal surgeries.  Patient also states that she had an ovarian cyst in the past.          Patient History   PAST HISTORY     Reviewed from Nursing Triage:       Past Medical History:   Diagnosis Date   • ADD (attention deficit disorder)    • HTN (hypertension) 2019   • Motion sickness    • Sleep apnea    • Type 2 diabetes mellitus (CMS/HCC)        Past Surgical History   Procedure Laterality Date   •  section     • Tubal ligation     • Ultrasound Guided Hysteroscopy, D&C N/A 2023    Performed by Regi Hudson DO at  OR       Family History   Problem Relation Name Age of Onset   • Breast cancer Biological Mother     • Hodgkin's lymphoma Biological Father         Social History     Tobacco Use   • Smoking status: Never   • Smokeless tobacco: Never   Vaping Use   • Vaping status: Never Used   Substance Use Topics   • Alcohol use: Yes     Comment: occasional   • Drug use: No         Review of Systems   REVIEW OF SYSTEMS     Review of Systems   Constitutional:  Negative for chills and fever.   HENT:  Negative for facial swelling and sore throat.    Eyes:  Negative for discharge and redness.   Respiratory:  Negative for cough and shortness of breath.    Cardiovascular:  Negative for chest pain and leg swelling.   Gastrointestinal:  Positive for abdominal pain. Negative for vomiting.   Genitourinary:  Negative for dysuria.   Musculoskeletal:  Negative for back pain and neck pain.   Skin:  Negative for rash.    Neurological:  Negative for weakness and headaches.         VITALS     ED Vitals      Date/Time Temp Pulse Resp BP SpO2 [facility]   06/02/25 2256 -- 78 18 160/100 96 % LR   06/02/25 1707 36.3 °C (97.4 °F) 90 16 199/104 98 % ML                         Physical Exam   PHYSICAL EXAM     Physical Exam  Constitutional:       General: She is not in acute distress.     Appearance: She is well-developed. She is not ill-appearing, toxic-appearing or diaphoretic.   HENT:      Head: Normocephalic and atraumatic.      Mouth/Throat:      Pharynx: Oropharynx is clear.   Eyes:      Extraocular Movements: Extraocular movements intact.   Cardiovascular:      Rate and Rhythm: Normal rate and regular rhythm.      Heart sounds: Normal heart sounds.   Pulmonary:      Effort: Pulmonary effort is normal.      Breath sounds: Normal breath sounds.   Abdominal:      General: Abdomen is flat. Bowel sounds are normal.      Palpations: Abdomen is soft.      Tenderness: There is abdominal tenderness in the left lower quadrant.   Skin:     General: Skin is warm and dry.      Comments: No peripheral edema no calf swelling or tenderness   Neurological:      Mental Status: She is alert and oriented to person, place, and time.      Motor: No weakness.           PROCEDURES     Procedures     DATA     Results       Procedure Component Value Units Date/Time    Comprehensive metabolic panel [[id number]]  (Abnormal) Collected: 06/02/25 1717    Specimen: Blood, Venous Updated: 06/02/25 1818     Sodium 139 mEQ/L      Potassium 3.4 mEQ/L      Comment: Results obtained on plasma. Plasma Potassium values may be up to 0.4 mEQ/L less than serum values. The differences may be greater for patients with high platelet or white cell counts.        Chloride 104 mEQ/L      CO2 30 mEQ/L      BUN 7 mg/dL      Creatinine 0.6 mg/dL      Glucose 120 mg/dL      Calcium 8.9 mg/dL      AST (SGOT) 23 IU/L      ALT (SGPT) 27 IU/L      Alkaline Phosphatase 55 IU/L      Total Protein  7.3 g/dL      Comment: Test performed on plasma which typically contains approximately 0.4 g/dL more protein than serum.        Albumin 4.4 g/dL      Bilirubin, Total 1.3 mg/dL      eGFR >60.0 mL/min/1.73m*2      Comment: Calculation based on the Chronic Kidney Disease Epidemiology Collaboration (CKD-EPI) equation refit without adjustment for race.        Anion Gap 5 mEQ/L     Urinalysis with Reflex Culture [[id number]]  (Normal) Collected: 06/02/25 1713    Specimen: Urine, Clean Catch Updated: 06/02/25 1759    Narrative:      The following orders were created for panel order Urinalysis with Reflex Culture.  Procedure                               Abnormality         Status                     ---------                               -----------         ------                     UA Reflex to Culture (Ma...[[id number]]  Normal              Final result                 Please view results for these tests on the individual orders.    UA Reflex to Culture (Macroscopic) [[id number]]  (Normal) Collected: 06/02/25 1713    Specimen: Urine, Clean Catch Updated: 06/02/25 1759     Color, Urine Colorless     Clarity, Urine Clear     Specific Gravity, Urine 1.008     pH, Urine 6.5     Leukocyte Esterase Negative     Comment: Results can be falsely negative due to high specific gravity, some antibiotics, glucose >3 g/dl, or WBC other than neutrophils.        Nitrite, Urine Negative     Protein, Urine Negative     Glucose, Urine Negative mg/dL      Ketones, Urine Negative mg/dL      Urobilinogen, Urine 0.2 EU/dL      Bilirubin, Urine Negative mg/dL      Blood, Urine Negative     Comment: The sensitivity of the occult blood test is equivalent to approximately 4 intact RBC/HPF.       CBC and differential [[id number]]  (Abnormal) Collected: 06/02/25 1717    Specimen: Blood, Venous Updated: 06/02/25 1757     WBC 10.04 K/uL      RBC 4.61 M/uL      Hemoglobin 14.1 g/dL      Hematocrit 40.2 %      MCV 87.2 fL      MCH 30.6 pg      MCHC 35.1  g/dL      RDW 12.0 %      Platelets 249 K/uL      MPV 8.8 fL      Differential Type Auto     nRBC 0.0 %      Immature Granulocytes 0.3 %      Neutrophils 70.6 %      Lymphocytes 20.2 %      Monocytes 6.5 %      Eosinophils 1.8 %      Basophils 0.6 %      Immature Granulocytes, Absolute 0.03 K/uL      Neutrophils, Absolute 7.09 K/uL      Lymphocytes, Absolute 2.03 K/uL      Monocytes, Absolute 0.65 K/uL      Eosinophils, Absolute 0.18 K/uL      Basophils, Absolute 0.06 K/uL             Imaging Results    None         No orders to display       Scoring tools                                  ED Course & MDM   MDM / ED COURSE / CLINICAL IMPRESSION / DISPO     Medical Decision Making  Amount and/or Complexity of Data Reviewed  Labs: ordered.  Radiology: ordered.    Risk  Prescription drug management.        ED Course as of 06/03/25 0638 Tue Jun 03, 2025 0537 Spoke to Dr. Garcia no acute intervention for cervical stenosis with fluid in the endometrial cavity.  Will discharge home give referral for OB/GYN discharge with Bentyl and Naprosyn. [TONI]      ED Course User Index  [TONI] Gallo Shipman DO     Clinical Impression      None                 Gallo Shipman DO  06/03/25 0663

## 2025-06-03 NOTE — PROGRESS NOTES
"Elda Curiel   [id number]    Your doctor has referred you for a   that requires the injection of an iodinated contrast material into your bloodstream. This iodinated contrast material, sometimes referred to as \"x-ray dye\" allows for better interpretation of the x-ray films or CT images and results in a more accurate interpretation of the examination.     Without the use of iodinated contrast (x-ray dye), the examination may be less informative and result in a suboptimal examination, and possibly a delay in diagnosis and, if needed, treatment.     The iodinated contrast material is given through a small needle or catheter placed into a vein, usually on the inside of the elbow, on the back of hand, or in a vein in the foot or lower leg.    The most common, though still rare, potential reaction to an intravenous contrast injection is an allergic-like reaction. Most allergic-like reactions are mild, though a small subset of people can have more severe reactions. Mild reactions include mild / scattered hives, itching, scratchy throat, sneezing and nasal congestion. More severe reactions include facial swelling, severe difficulty breathing, wheezing and anaphylactic shock. Those with a prior history allergic-like reaction to the same class of contrast media (such as CT contrast or MRI contrast) are at the highest risk for an allergic reaction.     If you believe you had an allergic reaction to contrast in the past, please let our staff know. We can determine if this increases your risk for a future reaction and provide steps to decrease the risk. This may delay your examination, but it decreases the risk of having a new and possibly more severe reaction to the contrast injection.    People with a history of prior allergic reactions to other substances (such as unrelated medications and food) and patients with a history of asthma have slightly increased risk for an allergic reaction from contrast material when " compared with the general population, but do not require to be pretreated prior to a contrast injection.    You should notify the physician, nurse or technologist if you have ever had any of these conditions or if you have any questions.

## 2025-06-03 NOTE — DISCHARGE INSTRUCTIONS
Return to the emergency room if any recurrent abdominal pain fever chills nausea vomiting.  Please also speak to your family doctor before restarting the Ozempic.  This may be contributing to some of your pain.

## 2025-06-04 ENCOUNTER — TELEPHONE (OUTPATIENT)
Dept: PRIMARY CARE | Facility: CLINIC | Age: 55
End: 2025-06-04

## 2025-06-04 RX ORDER — TRAMADOL HYDROCHLORIDE 50 MG/1
50 TABLET ORAL EVERY 6 HOURS PRN
Qty: 20 TABLET | Refills: 0 | Status: SHIPPED | OUTPATIENT
Start: 2025-06-04 | End: 2025-06-11

## 2025-06-04 NOTE — TELEPHONE ENCOUNTER
Request for Medical Advice (NON-URGENT)   Patient PCP: Juan Nogueira MD  New or Existing Issue: existing  Question or Concern: pt would like a call back from a nurse, states too long to explain, wants to talk to someone  Best contact number: [phone]  Preferred Pharmacy:   [facility] #[id number] - [place], [place] - [address] AT [facility]  [address]  [place] [address]  Phone: [phone] Fax: [phone]      The practice will reach out to discuss your Medical Question or Concern within 2 business days.

## 2025-06-04 NOTE — TELEPHONE ENCOUNTER
Post menopausal - Had [facility] Nov 2023 - OB now not taking insurance - Was able to get appt with [facility] - She will HOLD Ozempic till then - All meds reviewed given by ER and PCP

## 2025-07-01 RX ORDER — SEMAGLUTIDE 1.34 MG/ML
0.5 INJECTION, SOLUTION SUBCUTANEOUS
Qty: 3 ML | Refills: 0 | Status: SHIPPED | OUTPATIENT
Start: 2025-07-01

## 2025-07-16 RX ORDER — DEXTROAMPHETAMINE SACCHARATE, AMPHETAMINE ASPARTATE MONOHYDRATE, DEXTROAMPHETAMINE SULFATE AND AMPHETAMINE SULFATE 2.5; 2.5; 2.5; 2.5 MG/1; MG/1; MG/1; MG/1
10 CAPSULE, EXTENDED RELEASE ORAL EVERY MORNING
Qty: 30 CAPSULE | Refills: 0 | Status: SHIPPED | OUTPATIENT
Start: 2025-07-16 | End: 2025-08-15

## 2025-07-16 NOTE — TELEPHONE ENCOUNTER
Medicine Refill Request    Last Office Visit: 6/2/2025   Last Consult Visit: Visit date not found  Last Telemedicine Visit: Visit date not found    Next Appointment: Visit date not found      Current Outpatient Medications:     amphetamine-dextroamphetamine XR (ADDERALL XR) 10 mg 24 hr capsule, Take 1 capsule (10 mg total) by mouth every morning. (Patient taking differently: Take 10 mg by mouth as needed.), Disp: 30 capsule, Rfl: 0    dicyclomine (BENTYL) 20 mg tablet, Take 1 tablet (20 mg total) by mouth 2 (two) times a day for 4 days., Disp: 8 tablet, Rfl: 0    naproxen (NAPROSYN) 500 mg tablet, Take 1 tablet (500 mg total) by mouth 2 (two) times a day with breakfast and dinner for 3 days., Disp: 6 tablet, Rfl: 0    semaglutide (OZEMPIC) 0.25 mg or 0.5 mg(2 mg/1.5 mL) subcutaneous injection, Inject 0.5 mg under the skin every (seven) 7 days., Disp: 3 mL, Rfl: 0    semaglutide (OZEMPIC) 1 mg/dose (4 mg/3 mL) subcutaneous injection, INJECT 1 MG SUBCUTANEOUSLY EVERY 7 DAYS, Disp: 3 mL, Rfl: 2    zolpidem (AMBIEN) 5 mg tablet, Take 1 tablet (5 mg total) by mouth nightly as needed for sleep., Disp: 30 tablet, Rfl: 0      BP Readings from Last 3 Encounters:   06/03/25 132/80   06/02/25 (!) 143/94   05/31/25 137/87       Recent Lab results:  Lab Results   Component Value Date    CHOL 183 05/09/2025   ,   Lab Results   Component Value Date    HDL 50 05/09/2025   ,   Lab Results   Component Value Date    LDLCALC 108 (H) 05/09/2025   ,   Lab Results   Component Value Date    TRIG 139 05/09/2025        Lab Results   Component Value Date    GLUCOSE 120 (H) 06/02/2025   ,   Lab Results   Component Value Date    HGBA1C 4.8 05/09/2025         Lab Results   Component Value Date    CREATININE 0.6 06/02/2025       Lab Results   Component Value Date    TSH 1.060 05/09/2025           Lab Results   Component Value Date    HGBA1C 4.8 05/09/2025       Lab Results   Component Value Date    EGFR >60.0 06/02/2025    EGFR 106 05/09/2025     EGFR 101 11/05/2024

## 2025-07-28 NOTE — TELEPHONE ENCOUNTER
Medicine Refill Request    Last Office Visit: 6/2/2025   Last Consult Visit: Visit date not found  Last Telemedicine Visit: Visit date not found    Next Appointment: Visit date not found      Current Outpatient Medications:     amphetamine-dextroamphetamine XR (ADDERALL XR) 10 mg 24 hr capsule, Take 1 capsule (10 mg total) by mouth every morning., Disp: 30 capsule, Rfl: 0    dicyclomine (BENTYL) 20 mg tablet, Take 1 tablet (20 mg total) by mouth 2 (two) times a day for 4 days., Disp: 8 tablet, Rfl: 0    naproxen (NAPROSYN) 500 mg tablet, Take 1 tablet (500 mg total) by mouth 2 (two) times a day with breakfast and dinner for 3 days., Disp: 6 tablet, Rfl: 0    semaglutide (OZEMPIC) 0.25 mg or 0.5 mg(2 mg/1.5 mL) subcutaneous injection, Inject 0.5 mg under the skin every (seven) 7 days., Disp: 3 mL, Rfl: 0    semaglutide (OZEMPIC) 1 mg/dose (4 mg/3 mL) subcutaneous injection, INJECT 1 MG SUBCUTANEOUSLY EVERY 7 DAYS, Disp: 3 mL, Rfl: 2    zolpidem (AMBIEN) 5 mg tablet, Take 1 tablet (5 mg total) by mouth nightly as needed for sleep., Disp: 30 tablet, Rfl: 0      BP Readings from Last 3 Encounters:   06/03/25 132/80   06/02/25 (!) 143/94   05/31/25 137/87       Recent Lab results:  Lab Results   Component Value Date    CHOL 183 05/09/2025   ,   Lab Results   Component Value Date    HDL 50 05/09/2025   ,   Lab Results   Component Value Date    LDLCALC 108 (H) 05/09/2025   ,   Lab Results   Component Value Date    TRIG 139 05/09/2025        Lab Results   Component Value Date    GLUCOSE 120 (H) 06/02/2025   ,   Lab Results   Component Value Date    HGBA1C 4.8 05/09/2025         Lab Results   Component Value Date    CREATININE 0.6 06/02/2025       Lab Results   Component Value Date    TSH 1.060 05/09/2025           Lab Results   Component Value Date    HGBA1C 4.8 05/09/2025       Lab Results   Component Value Date    EGFR >60.0 06/02/2025    EGFR 106 05/09/2025    EGFR 101 11/05/2024

## 2025-07-30 RX ORDER — SEMAGLUTIDE 0.68 MG/ML
INJECTION, SOLUTION SUBCUTANEOUS
Qty: 3 ML | Refills: 0 | Status: SHIPPED | OUTPATIENT
Start: 2025-07-30

## 2025-08-12 ENCOUNTER — TELEPHONE (OUTPATIENT)
Dept: PRIMARY CARE | Facility: CLINIC | Age: 55
End: 2025-08-12
Payer: COMMERCIAL

## 2025-08-25 RX ORDER — SEMAGLUTIDE 0.68 MG/ML
INJECTION, SOLUTION SUBCUTANEOUS
Qty: 3 ML | Refills: 0 | Status: SHIPPED | OUTPATIENT
Start: 2025-08-25

## (undated) DEVICE — TOWEL SURGICAL W17XL27IN BLUE COTTON STANDARD PREWASHED DELI

## (undated) DEVICE — TUBING HYSTEROSCOPIC TRUCLEAR

## (undated) DEVICE — GLOVE SZ 6.5 PROTEXIS PI

## (undated) DEVICE — KIT HYSTEROSCOPIC PROCEDURE (TRUCLEAR)

## (undated) DEVICE — KIT SPECIMEN SOCK/TUBING TRUCLEAR

## (undated) DEVICE — PIPETS ENDOMETRIAL SUCTION CURETTE 3MM

## (undated) DEVICE — TUBING HYSTEROLUX INFLOW TUBE

## (undated) DEVICE — HYSTEROLUX OUTFLOW TUBING SET

## (undated) DEVICE — MANIFOLD FOUR PORT NEPTUNE

## (undated) DEVICE — SOLN IRRIG STER WATER 1000ML

## (undated) DEVICE — SEAL HYSTEROSCOPE ELITE

## (undated) DEVICE — GOWN SIRUS POLYRNF BRTHSLV XL 30/CS

## (undated) DEVICE — DRAPE UNDERBUTTOCK GRAD POUCH PORT 20/CS

## (undated) DEVICE — SLEEVE SCD COMFORT KNEE LENGTH MED

## (undated) DEVICE — SOLN IRRIG .9%SOD 3L

## (undated) DEVICE — Device